# Patient Record
Sex: FEMALE | Race: WHITE | NOT HISPANIC OR LATINO | Employment: FULL TIME | URBAN - METROPOLITAN AREA
[De-identification: names, ages, dates, MRNs, and addresses within clinical notes are randomized per-mention and may not be internally consistent; named-entity substitution may affect disease eponyms.]

---

## 2023-03-06 DIAGNOSIS — I10 HTN (HYPERTENSION), BENIGN: Primary | ICD-10-CM

## 2023-03-06 RX ORDER — TRIAMTERENE/HYDROCHLOROTHIAZID 37.5-25 MG
TABLET ORAL
Qty: 90 TABLET | Refills: 1 | Status: SHIPPED | OUTPATIENT
Start: 2023-03-06 | End: 2023-09-19

## 2023-11-13 NOTE — PROGRESS NOTES
Subjective   Patient ID: Lashon Murguia is a 53 y.o. female who presents for New Patient Visit (Prolapse post hysterectomy, needs mammogram order.).  She is new to the practice. No prior pap results or gyn visit is found for review in the EMR. She is s/p hysterectomy for heavy menses. Left ovary remains.  A few years ago she noted some prolapse and had a consultation but she did not proceed with any treatment. She notes a vaginal bulge, worse with standing. She denies any pain but has some pressure. She had a single episode of incontinence only and denies any stress incontinence. She feels she can empty her bladder fully and does not think she has difficulty with moving her bowels. She desires surgery for prolapse and she is not interested in wearing a pessary.          Review of Systems   Constitutional:  Negative for activity change.   HENT:  Negative for congestion.    Respiratory:  Negative for apnea and cough.    Cardiovascular:  Negative for chest pain.   Gastrointestinal:  Negative for constipation and diarrhea.   Genitourinary:  Negative for hematuria and vaginal pain.   Musculoskeletal:  Negative for joint swelling.   Neurological:  Negative for dizziness.   Psychiatric/Behavioral:  Negative for agitation.        Past Medical History:   Diagnosis Date    Body mass index (BMI) 23.0-23.9, adult 05/18/2021    Body mass index (BMI) of 23.0 to 23.9 in adult    Other idiopathic scoliosis, site unspecified     Scoliosis (and kyphoscoliosis), idiopathic    Personal history of diseases of the blood and blood-forming organs and certain disorders involving the immune mechanism 10/10/2017    History of anemia    Personal history of other medical treatment 10/10/2017    History of blood transfusion      Past Surgical History:   Procedure Laterality Date    BREAST LUMPECTOMY      HYSTERECTOMY  09/19/2016    Hysterectomy    MOLE REMOVAL      TONSILLECTOMY  09/19/2016    Tonsillectomy    TUBAL LIGATION  09/19/2016    Tubal  Ligation    WISDOM TOOTH EXTRACTION        No Known Allergies   Current Outpatient Medications on File Prior to Visit   Medication Sig Dispense Refill    omeprazole (PriLOSEC) 20 mg DR capsule Take 1 capsule (20 mg) by mouth once daily. 90 capsule 3    triamterene-hydrochlorothiazid (Maxzide-25) 37.5-25 mg tablet Take 1 tablet by mouth once daily. 90 tablet 3    [DISCONTINUED] omeprazole (PriLOSEC) 20 mg DR capsule take 1 capsule by mouth once daily 90 capsule 0    [DISCONTINUED] triamterene-hydrochlorothiazid (Maxzide-25) 37.5-25 mg tablet take 1 tablet by mouth once daily 90 tablet 0     No current facility-administered medications on file prior to visit.        Objective   Physical Exam  Constitutional:       Appearance: Normal appearance.   Neck:      Thyroid: No thyromegaly.   Cardiovascular:      Rate and Rhythm: Normal rate and regular rhythm.      Heart sounds: Normal heart sounds.   Pulmonary:      Effort: Pulmonary effort is normal.      Breath sounds: Normal breath sounds.   Chest:      Chest wall: No mass.   Breasts:     Right: Normal. No inverted nipple, mass, nipple discharge or skin change.      Left: Normal. No inverted nipple, mass, nipple discharge or skin change.   Abdominal:      General: There is no distension.      Palpations: Abdomen is soft. There is no mass.      Tenderness: There is no abdominal tenderness.   Genitourinary:     General: Normal vulva.      Exam position: Lithotomy position.      Labia:         Right: No rash.         Left: No rash.       Vagina: Prolapsed vaginal walls present. No lesions.      Uterus: Absent.       Adnexa: Right adnexa normal and left adnexa normal.        Right: No mass or tenderness.          Left: No mass or tenderness.        Comments: Moderate cystocele and vaginal vault prolapse is noted.   Musculoskeletal:         General: No deformity.      Cervical back: Neck supple.   Lymphadenopathy:      Cervical: No cervical adenopathy.   Skin:     General:  Skin is warm and dry.      Findings: No rash.   Neurological:      General: No focal deficit present.      Mental Status: She is alert.   Psychiatric:         Mood and Affect: Mood normal.         Behavior: Behavior is cooperative.         Thought Content: Thought content normal.           Problem List Items Addressed This Visit       Well woman exam with routine gynecological exam - Primary    Overview     She is s/p hysterectomy and removal of right ovary. Left Ovary remains.         Breast cancer screening by mammogram    Overview     Yearly mammograms are recommended.         Relevant Orders    BI mammo bilateral screening    Vaginal prolapse    Overview     Vaginal vault prolapse with cystocele. She desires surgical repair.          Current Assessment & Plan     Referral is placed to Dr Zaldivar, urogynecologist.          Relevant Orders    Referral to Urogynecology    Cystocele with prolapse

## 2023-11-14 ENCOUNTER — OFFICE VISIT (OUTPATIENT)
Dept: PRIMARY CARE | Facility: CLINIC | Age: 53
End: 2023-11-14
Payer: COMMERCIAL

## 2023-11-14 VITALS
WEIGHT: 128 LBS | DIASTOLIC BLOOD PRESSURE: 70 MMHG | RESPIRATION RATE: 15 BRPM | BODY MASS INDEX: 21.33 KG/M2 | SYSTOLIC BLOOD PRESSURE: 118 MMHG | OXYGEN SATURATION: 98 % | HEART RATE: 79 BPM | HEIGHT: 65 IN

## 2023-11-14 DIAGNOSIS — Z00.00 ENCOUNTER FOR ANNUAL PHYSICAL EXAM: Primary | ICD-10-CM

## 2023-11-14 DIAGNOSIS — K21.9 GASTROESOPHAGEAL REFLUX DISEASE WITHOUT ESOPHAGITIS: ICD-10-CM

## 2023-11-14 DIAGNOSIS — M79.605 PAIN AND SWELLING OF LEFT LOWER EXTREMITY: ICD-10-CM

## 2023-11-14 DIAGNOSIS — I10 HTN (HYPERTENSION), BENIGN: ICD-10-CM

## 2023-11-14 DIAGNOSIS — M79.89 PAIN AND SWELLING OF LEFT LOWER EXTREMITY: ICD-10-CM

## 2023-11-14 PROCEDURE — 3078F DIAST BP <80 MM HG: CPT | Performed by: FAMILY MEDICINE

## 2023-11-14 PROCEDURE — 1036F TOBACCO NON-USER: CPT | Performed by: FAMILY MEDICINE

## 2023-11-14 PROCEDURE — 99396 PREV VISIT EST AGE 40-64: CPT | Performed by: FAMILY MEDICINE

## 2023-11-14 PROCEDURE — 3074F SYST BP LT 130 MM HG: CPT | Performed by: FAMILY MEDICINE

## 2023-11-14 RX ORDER — OMEPRAZOLE 20 MG/1
20 CAPSULE, DELAYED RELEASE ORAL DAILY
Qty: 90 CAPSULE | Refills: 3 | Status: SHIPPED | OUTPATIENT
Start: 2023-11-14 | End: 2024-11-13

## 2023-11-14 RX ORDER — TRIAMTERENE/HYDROCHLOROTHIAZID 37.5-25 MG
1 TABLET ORAL DAILY
Qty: 90 TABLET | Refills: 3 | Status: SHIPPED | OUTPATIENT
Start: 2023-11-14 | End: 2024-11-13

## 2023-11-14 ASSESSMENT — ENCOUNTER SYMPTOMS
OCCASIONAL FEELINGS OF UNSTEADINESS: 0
DEPRESSION: 0
LOSS OF SENSATION IN FEET: 0

## 2023-11-14 ASSESSMENT — ANXIETY QUESTIONNAIRES
7. FEELING AFRAID AS IF SOMETHING AWFUL MIGHT HAPPEN: NOT AT ALL
6. BECOMING EASILY ANNOYED OR IRRITABLE: NOT AT ALL
3. WORRYING TOO MUCH ABOUT DIFFERENT THINGS: SEVERAL DAYS
4. TROUBLE RELAXING: NOT AT ALL
IF YOU CHECKED OFF ANY PROBLEMS ON THIS QUESTIONNAIRE, HOW DIFFICULT HAVE THESE PROBLEMS MADE IT FOR YOU TO DO YOUR WORK, TAKE CARE OF THINGS AT HOME, OR GET ALONG WITH OTHER PEOPLE: NOT DIFFICULT AT ALL
1. FEELING NERVOUS, ANXIOUS, OR ON EDGE: SEVERAL DAYS
GAD7 TOTAL SCORE: 3
2. NOT BEING ABLE TO STOP OR CONTROL WORRYING: SEVERAL DAYS
5. BEING SO RESTLESS THAT IT IS HARD TO SIT STILL: NOT AT ALL

## 2023-11-14 ASSESSMENT — PATIENT HEALTH QUESTIONNAIRE - PHQ9
1. LITTLE INTEREST OR PLEASURE IN DOING THINGS: NOT AT ALL
SUM OF ALL RESPONSES TO PHQ9 QUESTIONS 1 AND 2: 0
2. FEELING DOWN, DEPRESSED OR HOPELESS: NOT AT ALL

## 2023-11-14 ASSESSMENT — COLUMBIA-SUICIDE SEVERITY RATING SCALE - C-SSRS
2. HAVE YOU ACTUALLY HAD ANY THOUGHTS OF KILLING YOURSELF?: NO
1. IN THE PAST MONTH, HAVE YOU WISHED YOU WERE DEAD OR WISHED YOU COULD GO TO SLEEP AND NOT WAKE UP?: NO
6. HAVE YOU EVER DONE ANYTHING, STARTED TO DO ANYTHING, OR PREPARED TO DO ANYTHING TO END YOUR LIFE?: NO

## 2023-11-14 NOTE — PROGRESS NOTES
Subjective   Lashon Murguia is a 53 y.o. female and is here for a comprehensive physical exam. The patient reports problems - Had abnormal exam at Gyn  and has follow up with Tasneem Jones. She was told : Prolapsed vaginal walls (cystocele at introitus w/ strain ) present. May need referral to Dr. Zaldivar.   Last physical:   Changes no  Concerns no  Dentist yes  Vision yes  Hearing Problems no  Diet yes  Exercise yes Active at work  Alcohol no  Drugs no  Social History     Tobacco Use   Smoking Status Never   Smokeless Tobacco Never          Do you take any herbs or supplements that were not prescribed by a doctor? no  Are you taking calcium supplements? no  Are you taking aspirin daily? no      History:  LMP: No LMP recorded.  Menopause at hysterectomy   Last pap date: hysterectomy  Abnormal pap? no  : 3  Para: 3  32, 22, 21  Do you have pain that bothers you in your daily life? no  Review of Systems   All other systems reviewed and are negative.       Objective   Physical Exam  Vitals reviewed.   Constitutional:       Appearance: Normal appearance.   HENT:      Head: Normocephalic and atraumatic.      Right Ear: Tympanic membrane normal.      Left Ear: Tympanic membrane normal.      Nose: Nose normal.      Mouth/Throat:      Mouth: Mucous membranes are moist.      Pharynx: Oropharynx is clear.   Eyes:      Extraocular Movements: Extraocular movements intact.      Conjunctiva/sclera: Conjunctivae normal.      Pupils: Pupils are equal, round, and reactive to light.   Cardiovascular:      Rate and Rhythm: Normal rate and regular rhythm.      Pulses: Normal pulses.      Heart sounds: Normal heart sounds.   Pulmonary:      Effort: Pulmonary effort is normal.      Breath sounds: Normal breath sounds.   Abdominal:      General: Abdomen is flat. Bowel sounds are normal.      Palpations: Abdomen is soft.   Musculoskeletal:         General: Normal range of motion.      Cervical back: Normal range of motion and neck  supple.   Skin:     General: Skin is warm and dry.      Capillary Refill: Capillary refill takes less than 2 seconds.   Neurological:      General: No focal deficit present.      Mental Status: She is alert and oriented to person, place, and time.   Psychiatric:         Mood and Affect: Mood normal.         Behavior: Behavior normal.         Assessment/Plan   Healthy female exam. 53  year old female for annual wellness exam.    Recent weight loss- she is a bit stressed and lost a bit more weight recently.   1. 53 year old female- vaginal prolapse.   2. Patient Counseling:  --Nutrition: Stressed importance of moderation in sodium/caffeine intake, saturated fat and cholesterol, caloric balance, sufficient intake of fresh fruits, vegetables, fiber, calcium, iron, and 1 mg of folate supplement per day (for females capable of pregnancy).  --Discussed the issue of estrogen replacement, calcium supplement, and the daily use of baby aspirin.  --Exercise: Stressed the importance of regular exercise.   --Substance Abuse: Discussed cessation/primary prevention of tobacco, alcohol, or other drug use; driving or other dangerous activities under the influence; availability of treatment for abuse.    --Sexuality: Discussed sexually transmitted diseases, partner selection, use of condoms, avoidance of unintended pregnancy  and contraceptive alternatives.   --Injury prevention: Discussed safety belts, safety helmets, smoke detector, smoking near bedding or upholstery.   --Dental health: Discussed importance of regular tooth brushing, flossing, and dental visits.  --Immunizations reviewed.  --Discussed benefits of screening colonoscopy.  --After hours service discussed with patient  3. Discussed the patient's BMI with her.  The BMI is in the acceptable range.  4. Follow up in one year    Follow up if panic attacks increase in frequency.   Labwork ordered. Cologuard test and mammogram available  to you if you would like,.

## 2023-11-16 ENCOUNTER — OFFICE VISIT (OUTPATIENT)
Dept: OBSTETRICS AND GYNECOLOGY | Facility: CLINIC | Age: 53
End: 2023-11-16
Payer: COMMERCIAL

## 2023-11-16 VITALS
BODY MASS INDEX: 20.99 KG/M2 | HEIGHT: 65 IN | SYSTOLIC BLOOD PRESSURE: 110 MMHG | DIASTOLIC BLOOD PRESSURE: 70 MMHG | WEIGHT: 126 LBS

## 2023-11-16 DIAGNOSIS — Z12.31 BREAST CANCER SCREENING BY MAMMOGRAM: ICD-10-CM

## 2023-11-16 DIAGNOSIS — N81.10 VAGINAL PROLAPSE: ICD-10-CM

## 2023-11-16 DIAGNOSIS — N81.4 CYSTOCELE WITH PROLAPSE: ICD-10-CM

## 2023-11-16 DIAGNOSIS — Z01.419 WELL WOMAN EXAM WITH ROUTINE GYNECOLOGICAL EXAM: Primary | ICD-10-CM

## 2023-11-16 PROCEDURE — 1036F TOBACCO NON-USER: CPT | Performed by: OBSTETRICS & GYNECOLOGY

## 2023-11-16 PROCEDURE — 3074F SYST BP LT 130 MM HG: CPT | Performed by: OBSTETRICS & GYNECOLOGY

## 2023-11-16 PROCEDURE — 3078F DIAST BP <80 MM HG: CPT | Performed by: OBSTETRICS & GYNECOLOGY

## 2023-11-16 PROCEDURE — 99386 PREV VISIT NEW AGE 40-64: CPT | Performed by: OBSTETRICS & GYNECOLOGY

## 2023-11-16 ASSESSMENT — ENCOUNTER SYMPTOMS
CONSTIPATION: 0
HEMATURIA: 0
JOINT SWELLING: 0
AGITATION: 0
ACTIVITY CHANGE: 0
COUGH: 0
APNEA: 0
DIARRHEA: 0
DIZZINESS: 0

## 2023-11-24 ENCOUNTER — LAB (OUTPATIENT)
Dept: LAB | Facility: LAB | Age: 53
End: 2023-11-24
Payer: COMMERCIAL

## 2023-11-24 DIAGNOSIS — Z00.00 ENCOUNTER FOR ANNUAL PHYSICAL EXAM: ICD-10-CM

## 2023-11-24 LAB
ALBUMIN SERPL BCP-MCNC: 3.7 G/DL (ref 3.4–5)
ALP SERPL-CCNC: 59 U/L (ref 33–110)
ALT SERPL W P-5'-P-CCNC: 6 U/L (ref 7–45)
ANION GAP SERPL CALC-SCNC: 9 MMOL/L (ref 10–20)
AST SERPL W P-5'-P-CCNC: 9 U/L (ref 9–39)
BASOPHILS # BLD AUTO: 0.04 X10*3/UL (ref 0–0.1)
BASOPHILS NFR BLD AUTO: 0.6 %
BILIRUB SERPL-MCNC: 0.3 MG/DL (ref 0–1.2)
BUN SERPL-MCNC: 22 MG/DL (ref 6–23)
CALCIUM SERPL-MCNC: 8.5 MG/DL (ref 8.6–10.3)
CHLORIDE SERPL-SCNC: 106 MMOL/L (ref 98–107)
CHOLEST SERPL-MCNC: 145 MG/DL (ref 0–199)
CHOLESTEROL/HDL RATIO: 3.1
CO2 SERPL-SCNC: 28 MMOL/L (ref 21–32)
CREAT SERPL-MCNC: 0.66 MG/DL (ref 0.5–1.05)
EOSINOPHIL # BLD AUTO: 0.21 X10*3/UL (ref 0–0.7)
EOSINOPHIL NFR BLD AUTO: 3.1 %
ERYTHROCYTE [DISTWIDTH] IN BLOOD BY AUTOMATED COUNT: 13 % (ref 11.5–14.5)
GFR SERPL CREATININE-BSD FRML MDRD: >90 ML/MIN/1.73M*2
GLUCOSE SERPL-MCNC: 94 MG/DL (ref 74–99)
HCT VFR BLD AUTO: 35.3 % (ref 36–46)
HDLC SERPL-MCNC: 47.4 MG/DL
HGB BLD-MCNC: 11.5 G/DL (ref 12–16)
IMM GRANULOCYTES # BLD AUTO: 0.02 X10*3/UL (ref 0–0.7)
IMM GRANULOCYTES NFR BLD AUTO: 0.3 % (ref 0–0.9)
LDLC SERPL CALC-MCNC: 85 MG/DL
LYMPHOCYTES # BLD AUTO: 2.78 X10*3/UL (ref 1.2–4.8)
LYMPHOCYTES NFR BLD AUTO: 40.4 %
MCH RBC QN AUTO: 29.8 PG (ref 26–34)
MCHC RBC AUTO-ENTMCNC: 32.6 G/DL (ref 32–36)
MCV RBC AUTO: 92 FL (ref 80–100)
MONOCYTES # BLD AUTO: 0.47 X10*3/UL (ref 0.1–1)
MONOCYTES NFR BLD AUTO: 6.8 %
NEUTROPHILS # BLD AUTO: 3.36 X10*3/UL (ref 1.2–7.7)
NEUTROPHILS NFR BLD AUTO: 48.8 %
NON HDL CHOLESTEROL: 98 MG/DL (ref 0–149)
NRBC BLD-RTO: 0 /100 WBCS (ref 0–0)
PLATELET # BLD AUTO: 294 X10*3/UL (ref 150–450)
POTASSIUM SERPL-SCNC: 3.4 MMOL/L (ref 3.5–5.3)
PROT SERPL-MCNC: 6.1 G/DL (ref 6.4–8.2)
RBC # BLD AUTO: 3.86 X10*6/UL (ref 4–5.2)
SODIUM SERPL-SCNC: 140 MMOL/L (ref 136–145)
TRIGL SERPL-MCNC: 64 MG/DL (ref 0–149)
TSH SERPL-ACNC: 1.97 MIU/L (ref 0.44–3.98)
VLDL: 13 MG/DL (ref 0–40)
WBC # BLD AUTO: 6.9 X10*3/UL (ref 4.4–11.3)

## 2023-11-24 PROCEDURE — 36415 COLL VENOUS BLD VENIPUNCTURE: CPT

## 2023-11-24 PROCEDURE — 84443 ASSAY THYROID STIM HORMONE: CPT

## 2023-11-24 PROCEDURE — 80061 LIPID PANEL: CPT

## 2023-11-24 PROCEDURE — 85025 COMPLETE CBC W/AUTO DIFF WBC: CPT

## 2023-11-24 PROCEDURE — 80053 COMPREHEN METABOLIC PANEL: CPT

## 2023-12-04 ENCOUNTER — TELEPHONE (OUTPATIENT)
Dept: PRIMARY CARE | Facility: CLINIC | Age: 53
End: 2023-12-04
Payer: COMMERCIAL

## 2023-12-04 DIAGNOSIS — Z12.11 COLON CANCER SCREENING: ICD-10-CM

## 2023-12-04 DIAGNOSIS — E87.6 CHRONIC HYPOKALEMIA: Primary | ICD-10-CM

## 2023-12-04 NOTE — TELEPHONE ENCOUNTER
----- Message from Kyleigh Kwan MD sent at 12/4/2023  8:50 AM EST -----  Labs results received. A few abnormalities. Your potassium is low- that is most likely from the triamterene. As it is the second year in a row, I would consider a daily potassium supplement. May I send for you? Also you were a bit anemia, The most common cause of blood loss in the post menopausal woman is the gut, unless you have donated blood recently. Would recommend GI screening such as a colonoscopy or a cologuard test.Let me know if you would like to recheck in six months or order one of these.

## 2023-12-04 NOTE — RESULT ENCOUNTER NOTE
Labs results received. A few abnormalities. Your potassium is low- that is most likely from the triamterene. As it is the second year in a row, I would consider a daily potassium supplement. May I send for you? Also you were a bit anemia, The most common cause of blood loss in the post menopausal woman is the gut, unless you have donated blood recently. Would recommend GI screening such as a colonoscopy or a cologuard test.Let me know if you would like to recheck in six months or order one of these.

## 2023-12-05 RX ORDER — POTASSIUM CHLORIDE 20 MEQ/1
20 TABLET, EXTENDED RELEASE ORAL DAILY
Qty: 30 TABLET | Refills: 11 | Status: SHIPPED | OUTPATIENT
Start: 2023-12-05 | End: 2023-12-30 | Stop reason: SINTOL

## 2023-12-18 LAB — NONINV COLON CA DNA+OCC BLD SCRN STL QL: NEGATIVE

## 2023-12-27 ENCOUNTER — TELEPHONE (OUTPATIENT)
Dept: PRIMARY CARE | Facility: CLINIC | Age: 53
End: 2023-12-27
Payer: COMMERCIAL

## 2023-12-27 DIAGNOSIS — E87.6 HYPOKALEMIA: Primary | ICD-10-CM

## 2023-12-27 NOTE — TELEPHONE ENCOUNTER
----- Message from Kyleigh Kwan MD sent at 12/27/2023  1:31 PM EST -----  Cologuard results were received and were reported as within normal limits

## 2023-12-30 RX ORDER — POTASSIUM CHLORIDE 750 MG/1
20 CAPSULE, EXTENDED RELEASE ORAL DAILY
Qty: 60 CAPSULE | Refills: 11 | Status: SHIPPED | OUTPATIENT
Start: 2023-12-30 | End: 2024-12-29

## 2024-01-09 ENCOUNTER — OFFICE VISIT (OUTPATIENT)
Dept: UROLOGY | Facility: CLINIC | Age: 54
End: 2024-01-09
Payer: COMMERCIAL

## 2024-01-09 VITALS — DIASTOLIC BLOOD PRESSURE: 84 MMHG | SYSTOLIC BLOOD PRESSURE: 136 MMHG

## 2024-01-09 DIAGNOSIS — N81.10 VAGINAL PROLAPSE: Primary | ICD-10-CM

## 2024-01-09 LAB
POC BILIRUBIN, URINE: NEGATIVE
POC BLOOD, URINE: NEGATIVE
POC GLUCOSE, URINE: NEGATIVE MG/DL
POC KETONES, URINE: ABNORMAL MG/DL
POC LEUKOCYTES, URINE: NEGATIVE
POC NITRITE,URINE: NEGATIVE
POC PH, URINE: 6 PH
POC PROTEIN, URINE: ABNORMAL MG/DL
POC SPECIFIC GRAVITY, URINE: 1.01
POC UROBILINOGEN, URINE: 0.2 EU/DL

## 2024-01-09 PROCEDURE — 51798 US URINE CAPACITY MEASURE: CPT | Performed by: STUDENT IN AN ORGANIZED HEALTH CARE EDUCATION/TRAINING PROGRAM

## 2024-01-09 PROCEDURE — 81003 URINALYSIS AUTO W/O SCOPE: CPT | Performed by: STUDENT IN AN ORGANIZED HEALTH CARE EDUCATION/TRAINING PROGRAM

## 2024-01-09 PROCEDURE — 3079F DIAST BP 80-89 MM HG: CPT | Performed by: STUDENT IN AN ORGANIZED HEALTH CARE EDUCATION/TRAINING PROGRAM

## 2024-01-09 PROCEDURE — 1036F TOBACCO NON-USER: CPT | Performed by: STUDENT IN AN ORGANIZED HEALTH CARE EDUCATION/TRAINING PROGRAM

## 2024-01-09 PROCEDURE — 99205 OFFICE O/P NEW HI 60 MIN: CPT | Performed by: STUDENT IN AN ORGANIZED HEALTH CARE EDUCATION/TRAINING PROGRAM

## 2024-01-09 PROCEDURE — 3075F SYST BP GE 130 - 139MM HG: CPT | Performed by: STUDENT IN AN ORGANIZED HEALTH CARE EDUCATION/TRAINING PROGRAM

## 2024-01-09 RX ORDER — GABAPENTIN 300 MG/1
600 CAPSULE ORAL ONCE
Status: CANCELLED | OUTPATIENT
Start: 2024-01-09 | End: 2024-01-09

## 2024-01-09 RX ORDER — CELECOXIB 50 MG/1
400 CAPSULE ORAL ONCE
Status: CANCELLED | OUTPATIENT
Start: 2024-01-09 | End: 2024-01-09

## 2024-01-09 RX ORDER — PHENAZOPYRIDINE HYDROCHLORIDE 200 MG/1
200 TABLET, FILM COATED ORAL ONCE
Status: CANCELLED | OUTPATIENT
Start: 2024-01-09 | End: 2024-01-09

## 2024-01-09 RX ORDER — ACETAMINOPHEN 325 MG/1
975 TABLET ORAL ONCE
Status: CANCELLED | OUTPATIENT
Start: 2024-01-09 | End: 2024-01-09

## 2024-01-09 NOTE — PROGRESS NOTES
Chief Complaint  New patient - Bladder prolapse    History of Present Illness  The patient is a 53 year old female referred for a prolapsed bladder that is worsening.  and right salpingo-oophorectomy. she underwent TLH about 7 years ago.  She complains of vaginal bulge that has gotten worse she is noticing it more when she is working and at the end of the day.  She does not have significant incontinence but she has had one episode of incontinence several months ago. She stood up out of bed and immediately lost control of her bladder.  Her bowel movements are normal.  She has had no other pelvic surgery.  She has 3 children that were all born vaginally. 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    Review of Systems     Constitutional: No fever,~No chills~and~No fatigue.   Eyes: No vision problems~and~No dryness of the eyes.   ENT: No dry mouth,~No hearing loss~and~No nosebleeds.   Cardiovascular: No chest pain,~No palpitations~and~No orthopnea.   Respiratory: No shortness of breath,~No cough~and~No wheezing.   Gastrointestinal: No abdominal pain,~No constipation,~No nausea,~No diarrhea,~No vomiting~and~No melena.   Genitourinary: As noted in HPI.   Musculoskeletal: No back pain,~No myalgias,~No muscle weakness,~No joint swelling~and~No leg edema.   Integumentary: No rashes,~No skin lesion~and~No itching.   Neurological: No headache,~No numbness~and~No dizziness.   Psychiatric: No sleep disturbances,~No anxiety~and~No depression.   Endocrine: No hot flashes,~No loss of hair~and~No hirsutism.   Hematologic/Lymphatic: No swollen glands,~No tendency for easy bleeding~and~No tendency for easy bruising.   All other systems have been reviewed and are negative for complaint.     Surgical History  Hysterectomy - Left ovary still in place    Past Medical History:   Diagnosis Date    Body mass index (BMI) 23.0-23.9, adult 05/18/2021    Body mass index (BMI) of 23.0 to 23.9 in adult    Other idiopathic  scoliosis, site unspecified     Scoliosis (and kyphoscoliosis), idiopathic    Personal history of diseases of the blood and blood-forming organs and certain disorders involving the immune mechanism 10/10/2017    History of anemia    Personal history of other medical treatment 10/10/2017    History of blood transfusion        Current Outpatient Medications:     omeprazole (PriLOSEC) 20 mg DR capsule, Take 1 capsule (20 mg) by mouth once daily., Disp: 90 capsule, Rfl: 3    potassium chloride ER (Micro-K) 10 mEq ER capsule, Take 2 capsules (20 mEq) by mouth once daily. Do not crush or chew., Disp: 60 capsule, Rfl: 11    triamterene-hydrochlorothiazid (Maxzide-25) 37.5-25 mg tablet, Take 1 tablet by mouth once daily., Disp: 90 tablet, Rfl: 3       Assessment/Plan  52 yo with stage 2 Vaginal Prolapse    POP  I discussed treatment options including pessary and surgery, with regard to surgery  discussed SCP vs native tissue repair; for SCP the failure rate is 5-10%, but associated with mesh complications including erosion <1% and SBO <0.5% vs native tissue repair which is associated with 20-30% failure rate, but no long term risk of complications and only~15% requiring additional treatment.    Plan on lap scp,     F/up after UDS       Scribe Attestation  By signing my name below, I, Louie Mendes   attest that this documentation has been prepared under the direction and in the presence of Jeff Zaldivar MD.

## 2024-01-09 NOTE — LETTER
January 9, 2024     Ariana Mike MD  [address]  [address] [place] [id number]    Patient: Lashon Murguia   YOB: 1970   Date of Visit: 1/9/2024       Dear Dr. Ariana Mike MD:    Thank you for referring Lashon Murguia to me for evaluation. Below are my notes for this consultation.  If you have questions, please do not hesitate to call me. I look forward to following your patient along with you.       Sincerely,     Jeff Zaldivar MD      CC: Kyleigh Kwan MD  ______________________________________________________________________________________    Chief Complaint  New patient - Bladder prolapse    History of Present Illness  The patient is a 53 year old female referred for a prolapsed bladder that is worsening. Her left ovary is still in place after her hysterectomy about 6 or 7 years ago. She has had one episode of incontinence several months ago. She stood up out of bed and immediately lost control of her bladder. She has been trying to ignore the problem but it is becoming to much.  She has 3 children that were all born vaginally. 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    Review of Systems     Constitutional: No fever,~No chills~and~No fatigue.   Eyes: No vision problems~and~No dryness of the eyes.   ENT: No dry mouth,~No hearing loss~and~No nosebleeds.   Cardiovascular: No chest pain,~No palpitations~and~No orthopnea.   Respiratory: No shortness of breath,~No cough~and~No wheezing.   Gastrointestinal: No abdominal pain,~No constipation,~No nausea,~No diarrhea,~No vomiting~and~No melena.   Genitourinary: As noted in HPI.   Musculoskeletal: No back pain,~No myalgias,~No muscle weakness,~No joint swelling~and~No leg edema.   Integumentary: No rashes,~No skin lesion~and~No itching.   Neurological: No headache,~No numbness~and~No dizziness.   Psychiatric: No sleep disturbances,~No anxiety~and~No depression.   Endocrine: No hot flashes,~No loss of hair~and~No  hirsutism.   Hematologic/Lymphatic: No swollen glands,~No tendency for easy bleeding~and~No tendency for easy bruising.   All other systems have been reviewed and are negative for complaint.     Surgical History  Hysterectomy - Left ovary still in place    Past Medical History:   Diagnosis Date   • Body mass index (BMI) 23.0-23.9, adult 05/18/2021    Body mass index (BMI) of 23.0 to 23.9 in adult   • Other idiopathic scoliosis, site unspecified     Scoliosis (and kyphoscoliosis), idiopathic   • Personal history of diseases of the blood and blood-forming organs and certain disorders involving the immune mechanism 10/10/2017    History of anemia   • Personal history of other medical treatment 10/10/2017    History of blood transfusion        Current Outpatient Medications:   •  omeprazole (PriLOSEC) 20 mg DR capsule, Take 1 capsule (20 mg) by mouth once daily., Disp: 90 capsule, Rfl: 3  •  potassium chloride ER (Micro-K) 10 mEq ER capsule, Take 2 capsules (20 mEq) by mouth once daily. Do not crush or chew., Disp: 60 capsule, Rfl: 11  •  triamterene-hydrochlorothiazid (Maxzide-25) 37.5-25 mg tablet, Take 1 tablet by mouth once daily., Disp: 90 tablet, Rfl: 3       Assessment/Plan  54 yo with stage 2 Vaginal Prolapse    POP  I discussed treatment options including pessary and surgery, with regard to surgery  discussed SCP vs native tissue repair; for SCP the failure rate is 5-10%, but associated with mesh complications including erosion <1% and SBO <0.5% vs native tissue repair which is associated with 20-30% failure rate, but no long term risk of complications and only~15% requiring additional treatment.    Plan on lap scp,     F/up after UDS       Scribe Attestation  By signing my name below, I, Louie Mendes   attest that this documentation has been prepared under the direction and in the presence of Jeff Zaldivar MD.

## 2024-02-19 NOTE — PROGRESS NOTES
"CHIEF COMPLAINT: FUV         HISTORY OF PRESENT ILLNESS:  Lashon Murguia is a 54 y/o female presenting on 2/20/24 for a follow up. Patient reports issues with bowel movements. When she wakes up she experiences fecal urge but no incontinence. UDS demonstrates normal emptying no GEORGE, will not need sling          Past Medical History  She has a past medical history of Body mass index (BMI) 23.0-23.9, adult (05/18/2021), Other idiopathic scoliosis, site unspecified, Personal history of diseases of the blood and blood-forming organs and certain disorders involving the immune mechanism (10/10/2017), and Personal history of other medical treatment (10/10/2017).    Surgical History  She has a past surgical history that includes Tubal ligation (09/19/2016); Hysterectomy (09/19/2016); Tonsillectomy (09/19/2016); [place] tooth extraction; Breast lumpectomy; and Mole removal.     Social History  She reports that she has never smoked. She has never used smokeless tobacco. She reports that she does not currently use alcohol. She reports that she does not use drugs.    Family History  No family history on file.     Allergies  Patient has no known allergies.      A comprehensive 10+ review of systems was negative except for: see hpi                          PHYSICAL EXAMINATION:  BP Readings from Last 3 Encounters:   01/09/24 136/84   11/16/23 110/70   11/14/23 118/70      Wt Readings from Last 3 Encounters:   11/16/23 57.2 kg (126 lb)   11/14/23 58.1 kg (128 lb)   08/26/22 64.9 kg (143 lb)      BMI:   Estimated body mass index is 20.97 kg/m² as calculated from the following:    Height as of 11/16/23: 1.651 m (5' 5\").    Weight as of 11/16/23: 57.2 kg (126 lb).  BSA:   Estimated body surface area is 1.62 meters squared as calculated from the following:    Height as of 11/16/23: 1.651 m (5' 5\").    Weight as of 11/16/23: 57.2 kg (126 lb).  HEENT: Normocephalic, atraumatic, PER EOMI, nonicteric, trachea normal, thyroid normal, " oropharynx normal.  CARDIAC: regular rate & rhythm, S1 & S2 normal.  No heaves, thrills, gallops or murmurs.  LUNGS: Clear to auscultation, no spinal or CV tenderness.  EXTREMITIES: No evidence of cyanosis, clubbing or edema.           Provider Impressions:  52 yo with stage 2 Vaginal Prolapse     POP  Proceed with lap scp on 5/8/24   UDS negative, will not need sling     Follow up virtually 2 weeks pre-op      Jeff Zaldivar MD    By signing my name below, IDeandre Scribe   attest that this documentation has been prepared under the direction and in the presence of Dr. Jeff Zaldivar.

## 2024-02-20 ENCOUNTER — OFFICE VISIT (OUTPATIENT)
Dept: UROLOGY | Facility: CLINIC | Age: 54
End: 2024-02-20
Payer: COMMERCIAL

## 2024-02-20 ENCOUNTER — PROCEDURE VISIT (OUTPATIENT)
Dept: UROLOGY | Facility: CLINIC | Age: 54
End: 2024-02-20
Payer: COMMERCIAL

## 2024-02-20 DIAGNOSIS — N81.10 VAGINAL PROLAPSE: ICD-10-CM

## 2024-02-20 DIAGNOSIS — N81.9 FEMALE GENITAL PROLAPSE, UNSPECIFIED TYPE: Primary | ICD-10-CM

## 2024-02-20 PROCEDURE — 51741 ELECTRO-UROFLOWMETRY FIRST: CPT | Performed by: STUDENT IN AN ORGANIZED HEALTH CARE EDUCATION/TRAINING PROGRAM

## 2024-02-20 PROCEDURE — 99213 OFFICE O/P EST LOW 20 MIN: CPT | Performed by: STUDENT IN AN ORGANIZED HEALTH CARE EDUCATION/TRAINING PROGRAM

## 2024-02-20 PROCEDURE — 51729 CYSTOMETROGRAM W/VP&UP: CPT | Performed by: STUDENT IN AN ORGANIZED HEALTH CARE EDUCATION/TRAINING PROGRAM

## 2024-02-20 PROCEDURE — 1036F TOBACCO NON-USER: CPT | Performed by: STUDENT IN AN ORGANIZED HEALTH CARE EDUCATION/TRAINING PROGRAM

## 2024-02-20 PROCEDURE — 51784 ANAL/URINARY MUSCLE STUDY: CPT | Performed by: STUDENT IN AN ORGANIZED HEALTH CARE EDUCATION/TRAINING PROGRAM

## 2024-02-20 PROCEDURE — 51797 INTRAABDOMINAL PRESSURE TEST: CPT | Performed by: STUDENT IN AN ORGANIZED HEALTH CARE EDUCATION/TRAINING PROGRAM

## 2024-02-20 NOTE — PROGRESS NOTES
Urodynamic Study:  Lashon Murguia identified using 2 forms of identification. A timeout was completed, patient is in the correct position and all safety precautions have been taken.   Risks, Benefits and Alternatives:  Risks, benefits and alternatives were discussed in detail. The patient appears to understand and agrees to proceed. Lashon Murguia has completed, signed and dated the procedure consent form.    Uroflow completed.  Using sterile technique, a 7fr Air Charge Catheter was inserted into the bladder. Rectal catheter inserted approximately 15cm  Bladder filled with normal saline at a rate of 49.9ml/min 548.6  ml of normal saline instilled in bladder prior to voiding.   Results:  Post void residual  (PVR)  volume of  150 ml.      Patient here for urodynamic study. Discussed procedure. Bladder diary reviewed. Patient complains of prolapse. She feels a bulge but denies any incontinence/leaks. Patient did have a BM with uroflow. Performed UDS without difficulty. Instructed patient to increase fluid intake.

## 2024-04-23 ENCOUNTER — TELEMEDICINE (OUTPATIENT)
Dept: UROLOGY | Facility: CLINIC | Age: 54
End: 2024-04-23
Payer: COMMERCIAL

## 2024-04-23 DIAGNOSIS — N81.9 FEMALE GENITAL PROLAPSE, UNSPECIFIED TYPE: Primary | ICD-10-CM

## 2024-04-23 PROCEDURE — 99442 PR PHYS/QHP TELEPHONE EVALUATION 11-20 MIN: CPT | Performed by: STUDENT IN AN ORGANIZED HEALTH CARE EDUCATION/TRAINING PROGRAM

## 2024-04-23 RX ORDER — TAMSULOSIN HYDROCHLORIDE 0.4 MG/1
CAPSULE ORAL
Qty: 10 CAPSULE | Refills: 0 | Status: SHIPPED | OUTPATIENT
Start: 2024-04-23

## 2024-04-23 NOTE — PROGRESS NOTES
"HISTORY OF PRESENT ILLNESS:  Lashon Murguia is a 54 y.o. female who presents today for a pre-surgical virtual follow up visit. She confirms that she will be out of work for about 6 weeks after the surgery. She has no other questions at this time.          Past Medical History  She has a past medical history of Body mass index (BMI) 23.0-23.9, adult (05/18/2021), Other idiopathic scoliosis, site unspecified, Personal history of diseases of the blood and blood-forming organs and certain disorders involving the immune mechanism (10/10/2017), and Personal history of other medical treatment (10/10/2017).    Surgical History  She has a past surgical history that includes Tubal ligation (09/19/2016); Hysterectomy (09/19/2016); Tonsillectomy (09/19/2016); [place] tooth extraction; Breast lumpectomy; and Mole removal.     Social History  She reports that she has never smoked. She has never used smokeless tobacco. She reports that she does not currently use alcohol. She reports that she does not use drugs.    Family History  No family history on file.     Allergies  Patient has no known allergies.      A comprehensive 10+ review of systems was negative except for: see hpi                          PHYSICAL EXAMINATION:  BP Readings from Last 3 Encounters:   01/09/24 136/84   11/16/23 110/70   11/14/23 118/70      Wt Readings from Last 3 Encounters:   11/16/23 57.2 kg (126 lb)   11/14/23 58.1 kg (128 lb)   08/26/22 64.9 kg (143 lb)      BMI: Estimated body mass index is 20.97 kg/m² as calculated from the following:    Height as of 11/16/23: 1.651 m (5' 5\").    Weight as of 11/16/23: 57.2 kg (126 lb).  BSA: Estimated body surface area is 1.62 meters squared as calculated from the following:    Height as of 11/16/23: 1.651 m (5' 5\").    Weight as of 11/16/23: 57.2 kg (126 lb).  HEENT: Normocephalic, atraumatic, PER EOMI, nonicteric, trachea normal, thyroid normal, oropharynx normal.  CARDIAC: regular rate & rhythm, S1 & S2 normal.  " No heaves, thrills, gallops or murmurs.  LUNGS: Clear to auscultation, no spinal or CV tenderness.  EXTREMITIES: No evidence of cyanosis, clubbing or edema.               Assessment:  55 yo with stage 2 Vaginal Prolapse     POP  Proceed with lap scp on 5/8/24   UDS negative, will not need sling   Rx flomax, 3 days pre-op and 7 days post-op     Follow up post op      All questions and concerns were answered and addressed.  The patient expressed understanding and agrees with the plan.     Jeff Zaldivar MD    Scribe Attestation  By signing my name below, I, Melina Ortiz, Scribe   attest that this documentation has been prepared under the direction and in the presence of Jeff Zaldivar MD.

## 2024-05-06 ENCOUNTER — ANESTHESIA EVENT (OUTPATIENT)
Dept: OPERATING ROOM | Facility: HOSPITAL | Age: 54
End: 2024-05-06
Payer: COMMERCIAL

## 2024-05-07 RX ORDER — ALBUTEROL SULFATE 0.83 MG/ML
2.5 SOLUTION RESPIRATORY (INHALATION) ONCE AS NEEDED
Status: CANCELLED | OUTPATIENT
Start: 2024-05-07

## 2024-05-07 RX ORDER — ACETAMINOPHEN 325 MG/1
650 TABLET ORAL EVERY 4 HOURS PRN
Status: CANCELLED | OUTPATIENT
Start: 2024-05-07

## 2024-05-07 NOTE — H&P
History Of Present Illness  Lashon Murguia is a 53 yo with stage 2 Vaginal Prolapse presenting for lap scp.       UDS negative, will not need sling      Past Medical History  Past Medical History:   Diagnosis Date    Body mass index (BMI) 23.0-23.9, adult 05/18/2021    Body mass index (BMI) of 23.0 to 23.9 in adult    GERD (gastroesophageal reflux disease)     History of Anemia 10/10/2017    History of anemia    HTN (hypertension)     Idiopathic scoliosis, site unspecified     Scoliosis (and kyphoscoliosis), idiopathic    Personal history of other medical treatment 10/10/2017    History of blood transfusion    Vaginal prolapse        Surgical History  Past Surgical History:   Procedure Laterality Date    BREAST LUMPECTOMY      HYSTERECTOMY  09/19/2016    Hysterectomy    MOLE REMOVAL      TONSILLECTOMY  09/19/2016    Tonsillectomy    TUBAL LIGATION  09/19/2016    Tubal Ligation    WISDOM TOOTH EXTRACTION          Social History  She reports that she has never smoked. She has never used smokeless tobacco. She reports that she does not currently use alcohol. She reports that she does not use drugs.    Family History  No family history on file.     Allergies  Patient has no known allergies.    Review of Systems   All other systems reviewed and are negative.       Physical Exam  HENT:      Mouth/Throat:      Mouth: Mucous membranes are dry.   Eyes:      Pupils: Pupils are equal, round, and reactive to light.   Cardiovascular:      Rate and Rhythm: Normal rate.   Pulmonary:      Effort: Pulmonary effort is normal.   Abdominal:      General: Abdomen is flat.      Palpations: Abdomen is soft.   Musculoskeletal:         General: Normal range of motion.      Cervical back: Normal range of motion.   Skin:     General: Skin is warm and dry.   Neurological:      General: No focal deficit present.      Mental Status: She is alert.   Psychiatric:         Mood and Affect: Mood normal.          Last Recorded Vitals  There were no  vitals taken for this visit.         Assessment/Plan   Principal Problem:    Vaginal prolapse      lap scp           Mary Sommer MD

## 2024-05-08 ENCOUNTER — ANESTHESIA (OUTPATIENT)
Dept: OPERATING ROOM | Facility: HOSPITAL | Age: 54
End: 2024-05-08
Payer: COMMERCIAL

## 2024-05-08 ENCOUNTER — HOSPITAL ENCOUNTER (OUTPATIENT)
Facility: HOSPITAL | Age: 54
Setting detail: OUTPATIENT SURGERY
Discharge: HOME | End: 2024-05-08
Attending: STUDENT IN AN ORGANIZED HEALTH CARE EDUCATION/TRAINING PROGRAM | Admitting: STUDENT IN AN ORGANIZED HEALTH CARE EDUCATION/TRAINING PROGRAM
Payer: COMMERCIAL

## 2024-05-08 VITALS
RESPIRATION RATE: 18 BRPM | SYSTOLIC BLOOD PRESSURE: 110 MMHG | OXYGEN SATURATION: 99 % | DIASTOLIC BLOOD PRESSURE: 62 MMHG | HEART RATE: 63 BPM | BODY MASS INDEX: 21.3 KG/M2 | HEIGHT: 65 IN | WEIGHT: 127.87 LBS | TEMPERATURE: 96.8 F

## 2024-05-08 DIAGNOSIS — N81.4 CYSTOCELE WITH PROLAPSE: ICD-10-CM

## 2024-05-08 DIAGNOSIS — Z12.31 BREAST CANCER SCREENING BY MAMMOGRAM: ICD-10-CM

## 2024-05-08 DIAGNOSIS — N81.10 VAGINAL PROLAPSE: Primary | ICD-10-CM

## 2024-05-08 PROBLEM — J30.2 SEASONAL ALLERGIES: Status: ACTIVE | Noted: 2024-05-08

## 2024-05-08 PROCEDURE — 2720000007 HC OR 272 NO HCPCS: Performed by: STUDENT IN AN ORGANIZED HEALTH CARE EDUCATION/TRAINING PROGRAM

## 2024-05-08 PROCEDURE — 2500000004 HC RX 250 GENERAL PHARMACY W/ HCPCS (ALT 636 FOR OP/ED): Performed by: ANESTHESIOLOGY

## 2024-05-08 PROCEDURE — 2500000001 HC RX 250 WO HCPCS SELF ADMINISTERED DRUGS (ALT 637 FOR MEDICARE OP): Performed by: STUDENT IN AN ORGANIZED HEALTH CARE EDUCATION/TRAINING PROGRAM

## 2024-05-08 PROCEDURE — 57425 LAPAROSCOPY SURG COLPOPEXY: CPT | Performed by: STUDENT IN AN ORGANIZED HEALTH CARE EDUCATION/TRAINING PROGRAM

## 2024-05-08 PROCEDURE — 2780000003 HC OR 278 NO HCPCS: Performed by: STUDENT IN AN ORGANIZED HEALTH CARE EDUCATION/TRAINING PROGRAM

## 2024-05-08 PROCEDURE — 7100000010 HC PHASE TWO TIME - EACH INCREMENTAL 1 MINUTE: Performed by: STUDENT IN AN ORGANIZED HEALTH CARE EDUCATION/TRAINING PROGRAM

## 2024-05-08 PROCEDURE — 58925 REMOVAL OF OVARIAN CYST(S): CPT | Performed by: STUDENT IN AN ORGANIZED HEALTH CARE EDUCATION/TRAINING PROGRAM

## 2024-05-08 PROCEDURE — 2500000005 HC RX 250 GENERAL PHARMACY W/O HCPCS: Performed by: NURSE ANESTHETIST, CERTIFIED REGISTERED

## 2024-05-08 PROCEDURE — 3700000002 HC GENERAL ANESTHESIA TIME - EACH INCREMENTAL 1 MINUTE: Performed by: STUDENT IN AN ORGANIZED HEALTH CARE EDUCATION/TRAINING PROGRAM

## 2024-05-08 PROCEDURE — 7100000002 HC RECOVERY ROOM TIME - EACH INCREMENTAL 1 MINUTE: Performed by: STUDENT IN AN ORGANIZED HEALTH CARE EDUCATION/TRAINING PROGRAM

## 2024-05-08 PROCEDURE — C1781 MESH (IMPLANTABLE): HCPCS | Performed by: STUDENT IN AN ORGANIZED HEALTH CARE EDUCATION/TRAINING PROGRAM

## 2024-05-08 PROCEDURE — 7100000009 HC PHASE TWO TIME - INITIAL BASE CHARGE: Performed by: STUDENT IN AN ORGANIZED HEALTH CARE EDUCATION/TRAINING PROGRAM

## 2024-05-08 PROCEDURE — 3600000004 HC OR TIME - INITIAL BASE CHARGE - PROCEDURE LEVEL FOUR: Performed by: STUDENT IN AN ORGANIZED HEALTH CARE EDUCATION/TRAINING PROGRAM

## 2024-05-08 PROCEDURE — A57425 PR LAPAROSCOPY, SURG, COLPOPEXY: Performed by: NURSE ANESTHETIST, CERTIFIED REGISTERED

## 2024-05-08 PROCEDURE — 7100000001 HC RECOVERY ROOM TIME - INITIAL BASE CHARGE: Performed by: STUDENT IN AN ORGANIZED HEALTH CARE EDUCATION/TRAINING PROGRAM

## 2024-05-08 PROCEDURE — 2500000005 HC RX 250 GENERAL PHARMACY W/O HCPCS: Performed by: STUDENT IN AN ORGANIZED HEALTH CARE EDUCATION/TRAINING PROGRAM

## 2024-05-08 PROCEDURE — A57425 PR LAPAROSCOPY, SURG, COLPOPEXY: Performed by: STUDENT IN AN ORGANIZED HEALTH CARE EDUCATION/TRAINING PROGRAM

## 2024-05-08 PROCEDURE — 88305 TISSUE EXAM BY PATHOLOGIST: CPT | Mod: TC,GEALAB,WESLAB | Performed by: STUDENT IN AN ORGANIZED HEALTH CARE EDUCATION/TRAINING PROGRAM

## 2024-05-08 PROCEDURE — 3600000009 HC OR TIME - EACH INCREMENTAL 1 MINUTE - PROCEDURE LEVEL FOUR: Performed by: STUDENT IN AN ORGANIZED HEALTH CARE EDUCATION/TRAINING PROGRAM

## 2024-05-08 PROCEDURE — 3700000001 HC GENERAL ANESTHESIA TIME - INITIAL BASE CHARGE: Performed by: STUDENT IN AN ORGANIZED HEALTH CARE EDUCATION/TRAINING PROGRAM

## 2024-05-08 PROCEDURE — 2500000004 HC RX 250 GENERAL PHARMACY W/ HCPCS (ALT 636 FOR OP/ED): Performed by: NURSE ANESTHETIST, CERTIFIED REGISTERED

## 2024-05-08 PROCEDURE — 88305 TISSUE EXAM BY PATHOLOGIST: CPT | Performed by: STUDENT IN AN ORGANIZED HEALTH CARE EDUCATION/TRAINING PROGRAM

## 2024-05-08 DEVICE — MESH, Y, VERTESSA LITE 26 X 4 X 3CM: Type: IMPLANTABLE DEVICE | Site: BLADDER | Status: FUNCTIONAL

## 2024-05-08 RX ORDER — TRAMADOL HYDROCHLORIDE 50 MG/1
50 TABLET ORAL EVERY 6 HOURS PRN
Qty: 15 TABLET | Refills: 0 | Status: SHIPPED | OUTPATIENT
Start: 2024-05-08

## 2024-05-08 RX ORDER — PHENAZOPYRIDINE HYDROCHLORIDE 100 MG/1
200 TABLET, FILM COATED ORAL ONCE
Status: COMPLETED | OUTPATIENT
Start: 2024-05-08 | End: 2024-05-08

## 2024-05-08 RX ORDER — POLYETHYLENE GLYCOL 3350 17 G/17G
17 POWDER, FOR SOLUTION ORAL DAILY
Qty: 510 G | Refills: 0 | Status: SHIPPED | OUTPATIENT
Start: 2024-05-08 | End: 2024-06-07

## 2024-05-08 RX ORDER — ROCURONIUM BROMIDE 10 MG/ML
INJECTION, SOLUTION INTRAVENOUS AS NEEDED
Status: DISCONTINUED | OUTPATIENT
Start: 2024-05-08 | End: 2024-05-08

## 2024-05-08 RX ORDER — FENTANYL CITRATE 50 UG/ML
INJECTION, SOLUTION INTRAMUSCULAR; INTRAVENOUS AS NEEDED
Status: DISCONTINUED | OUTPATIENT
Start: 2024-05-08 | End: 2024-05-08

## 2024-05-08 RX ORDER — SODIUM CHLORIDE, SODIUM LACTATE, POTASSIUM CHLORIDE, CALCIUM CHLORIDE 600; 310; 30; 20 MG/100ML; MG/100ML; MG/100ML; MG/100ML
100 INJECTION, SOLUTION INTRAVENOUS CONTINUOUS
Status: DISCONTINUED | OUTPATIENT
Start: 2024-05-08 | End: 2024-05-08 | Stop reason: HOSPADM

## 2024-05-08 RX ORDER — GABAPENTIN 300 MG/1
600 CAPSULE ORAL ONCE
Status: COMPLETED | OUTPATIENT
Start: 2024-05-08 | End: 2024-05-08

## 2024-05-08 RX ORDER — DOCUSATE SODIUM 100 MG/1
100 CAPSULE, LIQUID FILLED ORAL 2 TIMES DAILY
Qty: 60 CAPSULE | Refills: 0 | Status: SHIPPED | OUTPATIENT
Start: 2024-05-08 | End: 2024-06-07

## 2024-05-08 RX ORDER — ONDANSETRON HYDROCHLORIDE 2 MG/ML
8 INJECTION, SOLUTION INTRAVENOUS ONCE
Status: DISCONTINUED | OUTPATIENT
Start: 2024-05-08 | End: 2024-05-08 | Stop reason: HOSPADM

## 2024-05-08 RX ORDER — CELECOXIB 400 MG/1
400 CAPSULE ORAL ONCE
Status: COMPLETED | OUTPATIENT
Start: 2024-05-08 | End: 2024-05-08

## 2024-05-08 RX ORDER — ACETAMINOPHEN 500 MG
1000 TABLET ORAL EVERY 6 HOURS PRN
Qty: 30 TABLET | Refills: 0 | Status: SHIPPED | OUTPATIENT
Start: 2024-05-08

## 2024-05-08 RX ORDER — ADHESIVE BANDAGE
15 BANDAGE TOPICAL DAILY PRN
Qty: 360 ML | Refills: 0 | Status: SHIPPED | OUTPATIENT
Start: 2024-05-08

## 2024-05-08 RX ORDER — CEFAZOLIN SODIUM 2 G/100ML
2 INJECTION, SOLUTION INTRAVENOUS ONCE
Status: DISCONTINUED | OUTPATIENT
Start: 2024-05-08 | End: 2024-05-08 | Stop reason: HOSPADM

## 2024-05-08 RX ORDER — CEFAZOLIN 1 G/1
INJECTION, POWDER, FOR SOLUTION INTRAVENOUS AS NEEDED
Status: DISCONTINUED | OUTPATIENT
Start: 2024-05-08 | End: 2024-05-08

## 2024-05-08 RX ORDER — KETOROLAC TROMETHAMINE 30 MG/ML
INJECTION, SOLUTION INTRAMUSCULAR; INTRAVENOUS AS NEEDED
Status: DISCONTINUED | OUTPATIENT
Start: 2024-05-08 | End: 2024-05-08

## 2024-05-08 RX ORDER — PROPOFOL 10 MG/ML
INJECTION, EMULSION INTRAVENOUS AS NEEDED
Status: DISCONTINUED | OUTPATIENT
Start: 2024-05-08 | End: 2024-05-08

## 2024-05-08 RX ORDER — MIDAZOLAM HYDROCHLORIDE 1 MG/ML
INJECTION INTRAMUSCULAR; INTRAVENOUS AS NEEDED
Status: DISCONTINUED | OUTPATIENT
Start: 2024-05-08 | End: 2024-05-08

## 2024-05-08 RX ORDER — LIDOCAINE HCL/PF 100 MG/5ML
SYRINGE (ML) INTRAVENOUS AS NEEDED
Status: DISCONTINUED | OUTPATIENT
Start: 2024-05-08 | End: 2024-05-08

## 2024-05-08 RX ORDER — ACETAMINOPHEN 325 MG/1
975 TABLET ORAL ONCE
Status: COMPLETED | OUTPATIENT
Start: 2024-05-08 | End: 2024-05-08

## 2024-05-08 RX ORDER — ONDANSETRON HYDROCHLORIDE 2 MG/ML
INJECTION, SOLUTION INTRAVENOUS AS NEEDED
Status: DISCONTINUED | OUTPATIENT
Start: 2024-05-08 | End: 2024-05-08

## 2024-05-08 RX ORDER — NORETHINDRONE AND ETHINYL ESTRADIOL 0.5-0.035
KIT ORAL AS NEEDED
Status: DISCONTINUED | OUTPATIENT
Start: 2024-05-08 | End: 2024-05-08

## 2024-05-08 RX ORDER — KETOROLAC TROMETHAMINE 10 MG/1
10 TABLET, FILM COATED ORAL EVERY 6 HOURS PRN
Qty: 20 TABLET | Refills: 0 | Status: SHIPPED | OUTPATIENT
Start: 2024-05-08

## 2024-05-08 RX ORDER — BUPIVACAINE HYDROCHLORIDE 5 MG/ML
INJECTION, SOLUTION PERINEURAL AS NEEDED
Status: DISCONTINUED | OUTPATIENT
Start: 2024-05-08 | End: 2024-05-08 | Stop reason: HOSPADM

## 2024-05-08 RX ADMIN — DEXAMETHASONE SODIUM PHOSPHATE 8 MG: 4 INJECTION INTRA-ARTICULAR; INTRALESIONAL; INTRAMUSCULAR; INTRAVENOUS; SOFT TISSUE at 07:39

## 2024-05-08 RX ADMIN — FENTANYL CITRATE 50 MCG: 50 INJECTION, SOLUTION INTRAMUSCULAR; INTRAVENOUS at 07:33

## 2024-05-08 RX ADMIN — KETOROLAC TROMETHAMINE 30 MG: 30 INJECTION, SOLUTION INTRAMUSCULAR at 10:03

## 2024-05-08 RX ADMIN — HYDROMORPHONE HYDROCHLORIDE 0.5 MG: 1 INJECTION, SOLUTION INTRAMUSCULAR; INTRAVENOUS; SUBCUTANEOUS at 10:57

## 2024-05-08 RX ADMIN — PHENAZOPYRIDINE 200 MG: 100 TABLET ORAL at 06:42

## 2024-05-08 RX ADMIN — EPHEDRINE SULFATE 5 MG: 50 INJECTION, SOLUTION INTRAVENOUS at 10:09

## 2024-05-08 RX ADMIN — ROCURONIUM BROMIDE 20 MG: 10 INJECTION, SOLUTION INTRAVENOUS at 08:22

## 2024-05-08 RX ADMIN — MIDAZOLAM HYDROCHLORIDE 2 MG: 1 INJECTION, SOLUTION INTRAMUSCULAR; INTRAVENOUS at 07:33

## 2024-05-08 RX ADMIN — SODIUM CHLORIDE, POTASSIUM CHLORIDE, SODIUM LACTATE AND CALCIUM CHLORIDE: 600; 310; 30; 20 INJECTION, SOLUTION INTRAVENOUS at 07:31

## 2024-05-08 RX ADMIN — FENTANYL CITRATE 50 MCG: 50 INJECTION, SOLUTION INTRAMUSCULAR; INTRAVENOUS at 08:04

## 2024-05-08 RX ADMIN — PROPOFOL 150 MG: 10 INJECTION, EMULSION INTRAVENOUS at 07:33

## 2024-05-08 RX ADMIN — ACETAMINOPHEN 975 MG: 325 TABLET ORAL at 06:42

## 2024-05-08 RX ADMIN — ROCURONIUM BROMIDE 10 MG: 10 INJECTION, SOLUTION INTRAVENOUS at 08:58

## 2024-05-08 RX ADMIN — EPHEDRINE SULFATE 5 MG: 50 INJECTION, SOLUTION INTRAVENOUS at 08:42

## 2024-05-08 RX ADMIN — EPHEDRINE SULFATE 5 MG: 50 INJECTION, SOLUTION INTRAVENOUS at 09:57

## 2024-05-08 RX ADMIN — EPHEDRINE SULFATE 10 MG: 50 INJECTION, SOLUTION INTRAVENOUS at 07:47

## 2024-05-08 RX ADMIN — GABAPENTIN 300 MG: 300 CAPSULE ORAL at 06:42

## 2024-05-08 RX ADMIN — SUGAMMADEX 200 MG: 100 INJECTION, SOLUTION INTRAVENOUS at 10:10

## 2024-05-08 RX ADMIN — LIDOCAINE HYDROCHLORIDE 50 MG: 20 INJECTION INTRAVENOUS at 07:33

## 2024-05-08 RX ADMIN — SODIUM CHLORIDE, POTASSIUM CHLORIDE, SODIUM LACTATE AND CALCIUM CHLORIDE: 600; 310; 30; 20 INJECTION, SOLUTION INTRAVENOUS at 09:38

## 2024-05-08 RX ADMIN — ROCURONIUM BROMIDE 50 MG: 10 INJECTION, SOLUTION INTRAVENOUS at 07:33

## 2024-05-08 RX ADMIN — SODIUM CHLORIDE, POTASSIUM CHLORIDE, SODIUM LACTATE AND CALCIUM CHLORIDE 100 ML/HR: 600; 310; 30; 20 INJECTION, SOLUTION INTRAVENOUS at 06:42

## 2024-05-08 RX ADMIN — CEFAZOLIN 2 G: 1 INJECTION, POWDER, FOR SOLUTION INTRAMUSCULAR; INTRAVENOUS at 07:34

## 2024-05-08 RX ADMIN — CELECOXIB 400 MG: 400 CAPSULE ORAL at 06:42

## 2024-05-08 RX ADMIN — ONDANSETRON 4 MG: 2 INJECTION INTRAMUSCULAR; INTRAVENOUS at 09:57

## 2024-05-08 SDOH — HEALTH STABILITY: MENTAL HEALTH: CURRENT SMOKER: 0

## 2024-05-08 ASSESSMENT — PAIN SCALES - GENERAL
PAINLEVEL_OUTOF10: 0 - NO PAIN
PAINLEVEL_OUTOF10: 0 - NO PAIN
PAINLEVEL_OUTOF10: 7
PAINLEVEL_OUTOF10: 10 - WORST POSSIBLE PAIN
PAINLEVEL_OUTOF10: 7

## 2024-05-08 ASSESSMENT — PAIN - FUNCTIONAL ASSESSMENT
PAIN_FUNCTIONAL_ASSESSMENT: 0-10

## 2024-05-08 NOTE — OP NOTE
SACROCOLPOPLEXY LAPAROSOPY, CYSTECTOMY OVARY LAPAROSCOPY (L) Operative Note     Date: 2024  OR Location: [facility] OR    Name: Lashon Murguia, : 1970, Age: 54 y.o., MRN: [id number], Sex: female    Diagnosis  Pre-op Diagnosis     * Vaginal prolapse [N81.10] Post-op Diagnosis     * Vaginal prolapse [N81.10]     Procedures  Laparoscopic sacrocolpopexy  Left ovarian cystectomy     Surgeons      * Jeff Zaldivar - Primary    Resident/Fellow/Other Assistant:  Surgeons and Role:  * No surgeons found with a matching role *    Procedure Summary  Anesthesia: Consult  ASA: II  Anesthesia Staff: Anesthesiologist: Conchita Keller MD  CRNA: LIVIER Ugarte-CRNA  Estimated Blood Loss: 10 mL  Intra-op Medications:   Administrations occurring from 0730 to 1000 on 24:   Medication Name Total Dose   lactated Ringer's infusion Cannot be calculated              Anesthesia Record               Intraprocedure I/O Totals          Intake    lactated Ringer's infusion 1000.00 mL    Total Intake 1000 mL          Specimen:   ID Type Source Tests Collected by Time   1 : DERMOID LEFT OVARY Tissue OVARY CYSTECTOMY LEFT SURGICAL PATHOLOGY EXAM Jeff Zaldivar MD 2024 0814        Staff:   Circulator: Christi Feldman RN; Christina Martinez RN  Relief Scrub: Aris Paul  Scrub Person: Joellen Ren         Drains and/or Catheters:   Urethral Catheter 16 Fr. (Active)       [REMOVED] NG/OG/Feeding Tube OG - [place] sump 18 Fr Center mouth (Removed)       Tourniquet Times:         Implants:  Implants       Type Name Action Serial No.      Surgical Mesh Sling Implant MESH, Y, VERTESSA LITE 26 X 4 X 3CM - [id number] Implanted               Findings: left ovarian dermoid, stage 2 prolapse    Indications: Lashon Murguia is an 54 y.o. female who is having surgery for Vaginal prolapse [N81.10].     The patient was seen in the preoperative area. The risks, benefits, complications, treatment options, non-operative alternatives, expected recovery and  outcomes were discussed with the patient. The possibilities of reaction to medication, pulmonary aspiration, injury to surrounding structures, bleeding, recurrent infection, the need for additional procedures, failure to diagnose a condition, and creating a complication requiring transfusion or operation were discussed with the patient. The patient concurred with the proposed plan, giving informed consent.  The site of surgery was properly noted/marked if necessary per policy. The patient has been actively warmed in preoperative area. Preoperative antibiotics have been ordered and given within 1 hours of incision. Venous thrombosis prophylaxis have been ordered including bilateral sequential compression devices    Procedure Details: Patient was taken to the operating room, prepped  and draped in usual sterile fashion after being placed in dorsal lithotomy. A Crenshaw catheter was placed in the bladder, and an EA sizer was placed in the vagina.  The abdomen was entered through the umbilicus using the open Alin technique and a 12 mm balloon port was inserted.  The abdomen  was insufflated with carbon dioxide gas to 15 mmHg of carbon dioxide gas. Two 5 mm ports were placed in the right and left lower quadrants and a 12 mm port was placed in the suprapubic site. All incisions were first injected with 1% lidocaine with epinephrine. The left ovary had a 2 cm abnormal looking cyst on it, this was amputated from the ovary and removed, the cyst was opened and had hair and sebaceous fluid in it.    The bladder was  then dissected off the anterior vaginal wall in order to  create site for mesh attachment.   The sacral promontory was identified.  The  peritoneum over the sacral promontory was grasped and entered sharply.  Careful sharp and blunt dissection were then used to develop the  presacral ligament plane.  The middle sacral artery was identified,  cauterized, and transected.  The peritoneum was then dissected in a  caudal  fashion toward the right uterosacral ligament in order to create  a space to peritonealize the mesh.  We then placed the Vertesa Light mesh into  the abdomen and attached at the anterior and posterior cervical stumps  and vagina using multiple interrupted 0 PDS sutures.  The vaginal apex  was then tensioned appropriately with the C-point 8 cm proximal to the  hymen and the mesh was attached to the sacral ligament using 2  interrupted 0 Ethibond sutures.  The peritoneum was then closed entirely  over the mesh using a running 0 Vicryl stitch.  Once this was completed,  cystoscopy was performed and spill of urine was noted bilaterally from the ureteral orifices.  The bladder was then fully evaluated and found to not have any abnormalities The bladder was drained and the Crenshaw was  replaced.  The umbilical fascia was closed using 0-vicryl stich.  The skin incisions were all closed using a running 4-0  Vicryl.   Complications:  None; patient tolerated the procedure well.    Disposition: PACU - hemodynamically stable.  Condition: stable         Additional Details:     Attending Attestation: I was present and scrubbed for the entire procedure.    Jeff Zaldivar  Phone Number: [phone]

## 2024-05-08 NOTE — ANESTHESIA PROCEDURE NOTES
Airway  Date/Time: 5/8/2024 7:38 AM  Urgency: elective    Airway not difficult    Staffing  Performed: CRNA   Authorized by: Conchita Keller MD    Performed by: LIVIER Ugarte-QING  Patient location during procedure: OR    Indications and Patient Condition  Indications for airway management: anesthesia  Spontaneous Ventilation: absent  Sedation level: deep  Preoxygenated: yes  Patient position: sniffing  Mask difficulty assessment: 1 - vent by mask    Final Airway Details  Final airway type: endotracheal airway      Successful airway: ETT  Cuffed: yes   Successful intubation technique: video laryngoscopy  Facilitating devices/methods: intubating stylet  Endotracheal tube insertion site: oral  Blade type: Vestec.  Blade size: #4  ETT size (mm): 7.0  Cormack-Lehane Classification: grade I - full view of glottis  Placement verified by: chest auscultation, capnometry and palpation of cuff   Cuff volume (mL): 10  Measured from: lips  ETT to lips (cm): 23  Number of attempts at approach: 1    Additional Comments  Lips and teeth remain in pre-anesthetic condition s/p intubation.

## 2024-05-08 NOTE — ANESTHESIA POSTPROCEDURE EVALUATION
Patient: Lashon Murguia    Procedure Summary       Date: 05/08/24 Room / Location: [facility] OR 07 / [facility]    Anesthesia Start: 0731 Anesthesia Stop: 1032    Procedures:       SACROCOLPOPLEXY LAPAROSOPY      CYSTECTOMY OVARY LAPAROSCOPY (Left)      Cystoscopy Rigid Diagnosis:       Vaginal prolapse      (Vaginal prolapse [N81.10])    Surgeons: Jeff Zaldivar MD Responsible Provider: Conchita Keller MD    Anesthesia Type: general ASA Status: 2            Anesthesia Type: general    Vitals Value Taken Time   /60 05/08/24 1056   Temp 36 °C (96.8 °F) 05/08/24 1026   Pulse 68 05/08/24 1056   Resp 18 05/08/24 1056   SpO2 100 % 05/08/24 1056       Anesthesia Post Evaluation    Patient location during evaluation: PACU  Patient participation: complete - patient participated  Level of consciousness: awake and alert  Pain management: adequate  Airway patency: patent  Cardiovascular status: acceptable  Respiratory status: acceptable  Hydration status: acceptable  Postoperative Nausea and Vomiting: none        There were no known notable events for this encounter.

## 2024-05-08 NOTE — ANESTHESIA PREPROCEDURE EVALUATION
Patient: Lashon Murguia    Procedure Information       Date/Time: 05/08/24 0730    Procedure: COLPOSUSPENSION LAPAROSOPY    Location: [facility] OR [facility] / [facility]    Surgeons: Jeff Zaldivar MD            Relevant Problems   Anesthesia (within normal limits)   (-) PONV (postoperative nausea and vomiting)      Cardiac   (+) HTN (hypertension), benign      Pulmonary (within normal limits)      Neuro (within normal limits)      GI   (+) Acid reflux      /Renal (within normal limits)      Liver (within normal limits)      HEENT   (+) Seasonal allergies       Clinical information reviewed:   Tobacco  Allergies  Meds   Med Hx  Surg Hx  OB Status  Fam Hx  Soc   Hx        Vitals:    05/08/24 0636   BP: (!) 113/47   Pulse: 66   Resp: 16   Temp: 36.4 °C (97.5 °F)   SpO2: 100%       Past Surgical History:   Procedure Laterality Date    BREAST LUMPECTOMY      HYSTERECTOMY  09/19/2016    Hysterectomy    MOLE REMOVAL      TONSILLECTOMY  09/19/2016    Tonsillectomy    TUBAL LIGATION  09/19/2016    Tubal Ligation    WISDOM TOOTH EXTRACTION       Past Medical History:   Diagnosis Date    Body mass index (BMI) 23.0-23.9, adult 05/18/2021    Body mass index (BMI) of 23.0 to 23.9 in adult    GERD (gastroesophageal reflux disease)     History of Anemia 10/10/2017    History of anemia    HTN (hypertension)     Idiopathic scoliosis, site unspecified     Scoliosis (and kyphoscoliosis), idiopathic    Personal history of other medical treatment 10/10/2017    History of blood transfusion    Vaginal prolapse        Current Facility-Administered Medications:     ceFAZolin in dextrose (iso-os) (Ancef) IVPB 2 g, 2 g, intravenous, Once, Jeff Zaldivar MD    lactated Ringer's infusion, 100 mL/hr, intravenous, Continuous, Sukh Valverde MD, Last Rate: 100 mL/hr at 05/08/24 0708, Continued by Anesthesia at 05/08/24 0708  Prior to Admission medications    Medication Sig Start Date End Date Taking? Authorizing Provider   omeprazole (PriLOSEC) 20 mg  "DR capsule Take 1 capsule (20 mg) by mouth once daily. 11/14/23 11/13/24 Yes Kyleigh Kwan MD   tamsulosin (Flomax) 0.4 mg 24 hr capsule Take 3 days before surgery and 7 days after 4/23/24  Yes Jeff Zaldivar MD   triamterene-hydrochlorothiazid (Maxzide-25) 37.5-25 mg tablet Take 1 tablet by mouth once daily. 11/14/23 11/13/24 Yes Kyleigh Kwan MD   potassium chloride ER (Micro-K) 10 mEq ER capsule Take 2 capsules (20 mEq) by mouth once daily. Do not crush or chew.  Patient not taking: Reported on 5/8/2024 12/30/23 12/29/24  Kyleigh Kwan MD     No Known Allergies  Social History     Tobacco Use    Smoking status: Never    Smokeless tobacco: Never   Substance Use Topics    Alcohol use: Not Currently         Chemistry    Lab Results   Component Value Date/Time     11/24/2023 0708    K 3.4 (L) 11/24/2023 0708     11/24/2023 0708    CO2 28 11/24/2023 0708    BUN 22 11/24/2023 0708    CREATININE 0.66 11/24/2023 0708    Lab Results   Component Value Date/Time    CALCIUM 8.5 (L) 11/24/2023 0708    ALKPHOS 59 11/24/2023 0708    AST 9 11/24/2023 0708    ALT 6 (L) 11/24/2023 0708    BILITOT 0.3 11/24/2023 0708          Lab Results   Component Value Date/Time    WBC 6.9 11/24/2023 0708    HGB 11.5 (L) 11/24/2023 0708    HCT 35.3 (L) 11/24/2023 0708     11/24/2023 0708     No results found for: \"PROTIME\", \"PTT\", \"INR\"  No results found for this or any previous visit (from the past 4464 hour(s)).  No results found for this or any previous visit from the past 1095 days.    NPO Detail:  NPO/Void Status  Carbohydrate Drink Given Prior to Surgery? : N  Date of Last Liquid: 05/07/24  Time of Last Liquid: 2000  Date of Last Solid: 05/07/24  Time of Last Solid: 1700  Last Intake Type: Clear fluids  Time of Last Void: 0600         Physical Exam    Airway  Mallampati: II  TM distance: <3 FB  Neck ROM: full     Cardiovascular   Rhythm: regular  Rate: normal     Dental - normal exam     Pulmonary "   Breath sounds clear to auscultation     Abdominal - normal exam             Anesthesia Plan    History of general anesthesia?: yes  History of complications of general anesthesia?: no    ASA 2     general     The patient is not a current smoker.    intravenous induction   Postoperative administration of opioids is intended.  Trial extubation is planned.  Anesthetic plan and risks discussed with patient.  Use of blood products discussed with patient who consented to blood products.    Plan discussed with CRNA.

## 2024-05-15 LAB
LABORATORY COMMENT REPORT: NORMAL
PATH REPORT.FINAL DX SPEC: NORMAL
PATH REPORT.GROSS SPEC: NORMAL
PATH REPORT.TOTAL CANCER: NORMAL

## 2024-05-21 NOTE — PROGRESS NOTES
[facility]  Outpatient Clinic Note    Patient: Lashon Murguia  Age/Sex: 54 y.o., female  MRN: [id number]  Virtual Visit: An interactive audio and video telecommunication system which permits real time communications between the patient (at the originating site) and provider (at the distant site) was utilized to provide this telehealth service. Verbal consent was requested and obtained from Lashon Murguia on this date 05/22/2024 for a telehealth visit.     Chief Complaint:  2 week post op         History of Present Illness  This is a 54 y.o. female, who presents urgently for the clinic for her 2-week postop visit.  The patient underwent a sacrocolpopexy with Dr. Zaldivar on May 8, 2024. She reports she has been doing well since surgery. She is eating and drinking, as normal. Urine has remained clear, yellow. Bowels have returned to normal. No drainage from incision site. She is ambulating at baseline. No fevers or chills. 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    Past Medical & Surgical History  Past Medical History:   Diagnosis Date    Body mass index (BMI) 23.0-23.9, adult 05/18/2021    Body mass index (BMI) of 23.0 to 23.9 in adult    GERD (gastroesophageal reflux disease)     History of Anemia 10/10/2017    History of anemia    HTN (hypertension)     Idiopathic scoliosis, site unspecified     Scoliosis (and kyphoscoliosis), idiopathic    Personal history of other medical treatment 10/10/2017    History of blood transfusion    Vaginal prolapse      Past Surgical History:   Procedure Laterality Date    BREAST LUMPECTOMY      HYSTERECTOMY  09/19/2016    Hysterectomy    MOLE REMOVAL      TONSILLECTOMY  09/19/2016    Tonsillectomy    TUBAL LIGATION  09/19/2016    Tubal Ligation    WISDOM TOOTH EXTRACTION         Family History  No family history on file.    Social History  She reports that she has never smoked. She has never used smokeless tobacco. She reports that she does not  currently use alcohol. She reports that she does not use drugs.    Allergies  Patient has no known allergies.    Medications:  Current Outpatient Medications on File Prior to Visit   Medication Sig Dispense Refill    acetaminophen (Tylenol) 500 mg tablet Take 2 tablets (1,000 mg) by mouth every 6 hours if needed for mild pain (1 - 3). 30 tablet 0    docusate sodium (Colace) 100 mg capsule Take 1 capsule (100 mg) by mouth 2 times a day. 60 capsule 0    ketorolac (Toradol) 10 mg tablet Take 1 tablet (10 mg) by mouth every 6 hours if needed for moderate pain (4 - 6). 20 tablet 0    magnesium hydroxide (Milk of Magnesia) 400 mg/5 mL suspension Take 15 mL by mouth once daily as needed for constipation (if have not had a BM by postoperative day 3). 360 mL 0    omeprazole (PriLOSEC) 20 mg DR capsule Take 1 capsule (20 mg) by mouth once daily. 90 capsule 3    polyethylene glycol (Glycolax, Miralax) 17 gram/dose powder Take 17 g by mouth once daily. 510 g 0    potassium chloride ER (Micro-K) 10 mEq ER capsule Take 2 capsules (20 mEq) by mouth once daily. Do not crush or chew. 60 capsule 11    tamsulosin (Flomax) 0.4 mg 24 hr capsule Take 3 days before surgery and 7 days after 10 capsule 0    traMADol (Ultram) 50 mg tablet Take 1 tablet (50 mg) by mouth every 6 hours if needed for severe pain (7 - 10). 15 tablet 0    triamterene-hydrochlorothiazid (Maxzide-25) 37.5-25 mg tablet Take 1 tablet by mouth once daily. 90 tablet 3     No current facility-administered medications on file prior to visit.        Review of Systems   A comprehensive 10+ review of systems was negative except for: see hpi          Physical Exam                                                                                                                      General: Well developed, well nourished, alert and cooperative, appears in no acute distress  Eyes: no proptosis  Lungs: Breathing is easy, non-labored while speaking in clear and complete sentences.    Neuro: alert and oriented to person, place and time  Psych: Demonstrates good judgement and reason, without hallucinations, abnormal affect or abnormal behaviors.  Skin: no obvious lesions, no rashes      Labs  N/A    Imaging  N/A    IMPRESSION AND PLAN:  Lashon Murguia is a 54 y.o. with stage 2 Vaginal Prolapse     POP  -UDS negative, will not need sling   -sacrocolpopexy with Dr. Zaldivar on May 8, 2024.   -Doing well, would like to go back to work    Follow up with Dr. Zaldivar for your 6 week post op visit on 6/4    All questions and concerns were answered and addressed.  The patient expressed understanding and agrees with the plan.     Reviewed and approved by PABLITO PIÑA on 5/22/24 at 11:57 AM.

## 2024-05-22 ENCOUNTER — TELEMEDICINE (OUTPATIENT)
Dept: UROLOGY | Facility: CLINIC | Age: 54
End: 2024-05-22
Payer: COMMERCIAL

## 2024-05-22 DIAGNOSIS — N81.10 VAGINAL PROLAPSE: ICD-10-CM

## 2024-05-22 DIAGNOSIS — Z09 POSTOP CHECK: Primary | ICD-10-CM

## 2024-05-22 PROCEDURE — 99024 POSTOP FOLLOW-UP VISIT: CPT

## 2024-05-22 PROCEDURE — 1036F TOBACCO NON-USER: CPT

## 2024-06-04 ENCOUNTER — APPOINTMENT (OUTPATIENT)
Dept: UROLOGY | Facility: CLINIC | Age: 54
End: 2024-06-04
Payer: COMMERCIAL

## 2024-06-11 ENCOUNTER — OFFICE VISIT (OUTPATIENT)
Dept: UROLOGY | Facility: CLINIC | Age: 54
End: 2024-06-11
Payer: COMMERCIAL

## 2024-06-11 DIAGNOSIS — Z09 POSTOP CHECK: Primary | ICD-10-CM

## 2024-06-11 PROCEDURE — 99024 POSTOP FOLLOW-UP VISIT: CPT | Performed by: STUDENT IN AN ORGANIZED HEALTH CARE EDUCATION/TRAINING PROGRAM

## 2024-06-11 NOTE — PROGRESS NOTES
"HISTORY OF PRESENT ILLNESS:  Lashon Murguia is a 54 y.o. female who presents today 6 weeks post op. She reports she is happy and doing well. She denies any issues.          Past Medical History  She has a past medical history of Body mass index (BMI) 23.0-23.9, adult (05/18/2021), GERD (gastroesophageal reflux disease), History of Anemia (10/10/2017), HTN (hypertension), Idiopathic scoliosis, site unspecified, Personal history of other medical treatment (10/10/2017), and Vaginal prolapse.    Surgical History  She has a past surgical history that includes Tubal ligation (09/19/2016); Hysterectomy (09/19/2016); Tonsillectomy (09/19/2016); [place] tooth extraction; Breast lumpectomy; and Mole removal.     Social History  She reports that she has never smoked. She has never used smokeless tobacco. She reports that she does not currently use alcohol. She reports that she does not use drugs.    Family History  No family history on file.     Allergies  Patient has no known allergies.      A comprehensive 10+ review of systems was negative except for: see hpi                          PHYSICAL EXAMINATION:  BP Readings from Last 3 Encounters:   05/08/24 110/62   01/09/24 136/84   11/16/23 110/70      Wt Readings from Last 3 Encounters:   05/08/24 58 kg (127 lb 13.9 oz)   11/16/23 57.2 kg (126 lb)   11/14/23 58.1 kg (128 lb)      BMI: Estimated body mass index is 21.28 kg/m² as calculated from the following:    Height as of 5/8/24: 1.651 m (5' 5\").    Weight as of 5/8/24: 58 kg (127 lb 13.9 oz).  BSA: Estimated body surface area is 1.63 meters squared as calculated from the following:    Height as of 5/8/24: 1.651 m (5' 5\").    Weight as of 5/8/24: 58 kg (127 lb 13.9 oz).  HEENT: Normocephalic, atraumatic, PER EOMI, nonicteric, trachea normal, thyroid normal, oropharynx normal.  CARDIAC: regular rate & rhythm, S1 & S2 normal.  No heaves, thrills, gallops or murmurs.  LUNGS: Clear to auscultation, no spinal or CV " tenderness.  EXTREMITIES: No evidence of cyanosis, clubbing or edema.       Stage 0 pop         Assessment:  Lashon Murguia is a 54 y.o. with stage 2 Vaginal Prolapse     POP  -UDS negative, will not need sling   -S/P sacrocolpopexy May 8, 2024, doing well   -Doing well  -Follow up in 1 year       All questions and concerns were answered and addressed.  The patient expressed understanding and agrees with the plan.     Jeff Zaldivar MD    Scribe Attestation  By signing my name below, I, Louie Castaneda   attest that this documentation has been prepared under the direction and in the presence of Jeff Zaldivar MD.

## 2024-07-08 ENCOUNTER — LAB (OUTPATIENT)
Dept: LAB | Facility: LAB | Age: 54
End: 2024-07-08
Payer: COMMERCIAL

## 2024-07-08 ENCOUNTER — TELEPHONE (OUTPATIENT)
Dept: UROLOGY | Facility: CLINIC | Age: 54
End: 2024-07-08

## 2024-07-08 DIAGNOSIS — N39.0 RECURRENT UTI: ICD-10-CM

## 2024-07-08 DIAGNOSIS — N39.0 RECURRENT UTI: Primary | ICD-10-CM

## 2024-07-08 PROCEDURE — 87086 URINE CULTURE/COLONY COUNT: CPT

## 2024-07-08 NOTE — TELEPHONE ENCOUNTER
Patient thinks she has a UTI, she is a patient of [facility], can you please put a order in for her to have a culture done.  Thank you

## 2024-07-09 LAB — BACTERIA UR CULT: NO GROWTH

## 2024-10-18 ENCOUNTER — APPOINTMENT (OUTPATIENT)
Dept: UROLOGY | Facility: CLINIC | Age: 54
End: 2024-10-18
Payer: COMMERCIAL

## 2024-12-22 ASSESSMENT — PROMIS GLOBAL HEALTH SCALE
CARRYOUT_SOCIAL_ACTIVITIES: VERY GOOD
RATE_SOCIAL_SATISFACTION: GOOD
RATE_QUALITY_OF_LIFE: VERY GOOD
RATE_PHYSICAL_HEALTH: VERY GOOD
EMOTIONAL_PROBLEMS: OFTEN
RATE_MENTAL_HEALTH: FAIR
RATE_AVERAGE_FATIGUE: MILD
RATE_AVERAGE_PAIN: 0
RATE_GENERAL_HEALTH: VERY GOOD
CARRYOUT_PHYSICAL_ACTIVITIES: COMPLETELY

## 2024-12-24 ENCOUNTER — APPOINTMENT (OUTPATIENT)
Dept: PRIMARY CARE | Facility: CLINIC | Age: 54
End: 2024-12-24
Payer: COMMERCIAL

## 2024-12-24 VITALS
HEART RATE: 84 BPM | WEIGHT: 128 LBS | OXYGEN SATURATION: 98 % | BODY MASS INDEX: 21.33 KG/M2 | SYSTOLIC BLOOD PRESSURE: 124 MMHG | HEIGHT: 65 IN | RESPIRATION RATE: 16 BRPM | DIASTOLIC BLOOD PRESSURE: 76 MMHG

## 2024-12-24 DIAGNOSIS — Z00.00 ROUTINE GENERAL MEDICAL EXAMINATION AT A HEALTH CARE FACILITY: Primary | ICD-10-CM

## 2024-12-24 DIAGNOSIS — K59.09 CHRONIC CONSTIPATION: ICD-10-CM

## 2024-12-24 PROBLEM — M79.89 PAIN AND SWELLING OF LEFT LOWER EXTREMITY: Status: RESOLVED | Noted: 2023-11-14 | Resolved: 2024-12-24

## 2024-12-24 PROBLEM — N60.81: Status: ACTIVE | Noted: 2019-02-27

## 2024-12-24 PROBLEM — R92.8 ABNORMAL MAMMOGRAM: Status: ACTIVE | Noted: 2024-12-24

## 2024-12-24 PROBLEM — I87.2 VENOUS INSUFFICIENCY: Status: ACTIVE | Noted: 2024-12-24

## 2024-12-24 PROBLEM — R30.0 DYSURIA: Status: RESOLVED | Noted: 2024-12-24 | Resolved: 2024-12-24

## 2024-12-24 PROBLEM — J40 BRONCHITIS: Status: ACTIVE | Noted: 2024-12-24

## 2024-12-24 PROBLEM — N39.0 URINARY TRACT INFECTION: Status: ACTIVE | Noted: 2024-12-24

## 2024-12-24 PROBLEM — M79.605 PAIN AND SWELLING OF LEFT LOWER EXTREMITY: Status: RESOLVED | Noted: 2023-11-14 | Resolved: 2024-12-24

## 2024-12-24 PROBLEM — Z86.2 HISTORY OF ANEMIA: Status: RESOLVED | Noted: 2024-12-24 | Resolved: 2024-12-24

## 2024-12-24 PROBLEM — I10 HTN (HYPERTENSION), BENIGN: Status: RESOLVED | Noted: 2023-11-14 | Resolved: 2024-12-24

## 2024-12-24 PROBLEM — M41.9 IDIOPATHIC KYPHOSCOLIOSIS: Status: ACTIVE | Noted: 2024-12-24

## 2024-12-24 PROBLEM — J01.90 ACUTE SINUSITIS: Status: RESOLVED | Noted: 2024-12-24 | Resolved: 2024-12-24

## 2024-12-24 PROBLEM — G57.12 MERALGIA PARESTHETICA OF LEFT SIDE: Status: ACTIVE | Noted: 2024-12-24

## 2024-12-24 PROCEDURE — 3008F BODY MASS INDEX DOCD: CPT | Performed by: FAMILY MEDICINE

## 2024-12-24 PROCEDURE — 99396 PREV VISIT EST AGE 40-64: CPT | Performed by: FAMILY MEDICINE

## 2024-12-24 PROCEDURE — 1036F TOBACCO NON-USER: CPT | Performed by: FAMILY MEDICINE

## 2024-12-24 ASSESSMENT — ENCOUNTER SYMPTOMS
LOSS OF SENSATION IN FEET: 0
OCCASIONAL FEELINGS OF UNSTEADINESS: 0
DEPRESSION: 0
CONSTIPATION: 1

## 2024-12-24 ASSESSMENT — PATIENT HEALTH QUESTIONNAIRE - PHQ9
SUM OF ALL RESPONSES TO PHQ9 QUESTIONS 1 AND 2: 0
1. LITTLE INTEREST OR PLEASURE IN DOING THINGS: NOT AT ALL
2. FEELING DOWN, DEPRESSED OR HOPELESS: NOT AT ALL

## 2024-12-24 ASSESSMENT — ANXIETY QUESTIONNAIRES
7. FEELING AFRAID AS IF SOMETHING AWFUL MIGHT HAPPEN: NOT AT ALL
GAD7 TOTAL SCORE: 0
4. TROUBLE RELAXING: NOT AT ALL
3. WORRYING TOO MUCH ABOUT DIFFERENT THINGS: NOT AT ALL
5. BEING SO RESTLESS THAT IT IS HARD TO SIT STILL: NOT AT ALL
1. FEELING NERVOUS, ANXIOUS, OR ON EDGE: NOT AT ALL
2. NOT BEING ABLE TO STOP OR CONTROL WORRYING: NOT AT ALL
IF YOU CHECKED OFF ANY PROBLEMS ON THIS QUESTIONNAIRE, HOW DIFFICULT HAVE THESE PROBLEMS MADE IT FOR YOU TO DO YOUR WORK, TAKE CARE OF THINGS AT HOME, OR GET ALONG WITH OTHER PEOPLE: NOT DIFFICULT AT ALL
6. BECOMING EASILY ANNOYED OR IRRITABLE: NOT AT ALL

## 2024-12-24 ASSESSMENT — COLUMBIA-SUICIDE SEVERITY RATING SCALE - C-SSRS
2. HAVE YOU ACTUALLY HAD ANY THOUGHTS OF KILLING YOURSELF?: NO
6. HAVE YOU EVER DONE ANYTHING, STARTED TO DO ANYTHING, OR PREPARED TO DO ANYTHING TO END YOUR LIFE?: NO
1. IN THE PAST MONTH, HAVE YOU WISHED YOU WERE DEAD OR WISHED YOU COULD GO TO SLEEP AND NOT WAKE UP?: NO

## 2024-12-24 NOTE — PROGRESS NOTES
Subjective   Lashon Murguia is a 54 y.o. female and is here for a comprehensive physical exam. The patient reports problems - Surgery May 2024 for vaginal prolapse. , and now is very constipated. Not sure of from that. Is trying to increase fiber and greens, takes laxative four colace with laxative.  Remote hysterectomy one ovary, no hot flashes. Has weaned off triamterene. She is taking papaya enzymes, chewing and is now off omeprazole.   Last physical:   Changes yes See above  Concerns no  Dentist yes  Vision no  Hearing Problems yes  Diet yes  Exercise no Active at work with steps   Alcohol no  Drugs no  Social History     Tobacco Use   Smoking Status Never   Smokeless Tobacco Never          Do you take any herbs or supplements that were not prescribed by a doctor? yes Papaya enzyme  Are you taking calcium supplements? no  Are you taking aspirin daily? no      History:  LMP: No LMP recorded. Patient has had a hysterectomy.  Menopause at n/a years  Last pap date: n/a  Abnormal pap? no  : 3  Para: 3    Do you have pain that bothers you in your daily life? no  Review of Systems   Gastrointestinal:  Positive for constipation.   Genitourinary:  Positive for enuresis.        Resolved with surgery        Objective   Physical Exam  Vitals reviewed.   Constitutional:       Appearance: Normal appearance.   HENT:      Head: Normocephalic and atraumatic.      Right Ear: Tympanic membrane normal.      Left Ear: Tympanic membrane normal.      Nose: Nose normal.      Mouth/Throat:      Mouth: Mucous membranes are moist.      Pharynx: Oropharynx is clear.   Eyes:      Extraocular Movements: Extraocular movements intact.      Conjunctiva/sclera: Conjunctivae normal.      Pupils: Pupils are equal, round, and reactive to light.   Cardiovascular:      Rate and Rhythm: Normal rate and regular rhythm.      Pulses: Normal pulses.      Heart sounds: Normal heart sounds.   Pulmonary:      Effort: Pulmonary effort is normal.       Breath sounds: Normal breath sounds.   Abdominal:      General: Abdomen is flat. Bowel sounds are normal.      Palpations: Abdomen is soft.   Musculoskeletal:         General: Normal range of motion.      Cervical back: Normal range of motion and neck supple.   Skin:     General: Skin is warm and dry.      Capillary Refill: Capillary refill takes less than 2 seconds.   Neurological:      General: No focal deficit present.      Mental Status: She is alert and oriented to person, place, and time.   Psychiatric:         Mood and Affect: Mood normal.         Behavior: Behavior normal.         Assessment/Plan   Healthy female exam. 54 year old female for annual exam. Mammogram reordered, will order labwork. Chronic constipation. Will try Linzess as she has failed stool softener and laxative,.      1. Healthy exam,   2. Patient Counseling:  --Nutrition: Stressed importance of moderation in sodium/caffeine intake, saturated fat and cholesterol, caloric balance, sufficient intake of fresh fruits, vegetables, fiber, calcium, iron, and 1 mg of folate supplement per day (for females capable of pregnancy).  --Discussed the issue of estrogen replacement, calcium supplement, and the daily use of baby aspirin.  --Exercise: Stressed the importance of regular exercise.   --Substance Abuse: Discussed cessation/primary prevention of tobacco, alcohol, or other drug use; driving or other dangerous activities under the influence; availability of treatment for abuse.    --Sexuality: Discussed sexually transmitted diseases, partner selection, use of condoms, avoidance of unintended pregnancy  and contraceptive alternatives.   --Injury prevention: Discussed safety belts, safety helmets, smoke detector, smoking near bedding or upholstery.   --Dental health: Discussed importance of regular tooth brushing, flossing, and dental visits.  --Immunizations reviewed.  --Discussed benefits of screening colonoscopy.  --After hours service discussed  with patient  3. Discussed the patient's BMI with her.  The BMI is in the acceptable range.  4. Follow up in one year

## 2024-12-24 NOTE — PATIENT INSTRUCTIONS
Continue bowel regimen.   Handouts on fiber and constipation  I added Linzess, to take daily,   Water (64 ounces per day)

## 2024-12-28 ENCOUNTER — LAB (OUTPATIENT)
Dept: LAB | Facility: LAB | Age: 54
End: 2024-12-28
Payer: COMMERCIAL

## 2024-12-28 DIAGNOSIS — Z00.00 ROUTINE GENERAL MEDICAL EXAMINATION AT A HEALTH CARE FACILITY: ICD-10-CM

## 2024-12-28 LAB
ALBUMIN SERPL BCP-MCNC: 3.9 G/DL (ref 3.4–5)
ALP SERPL-CCNC: 58 U/L (ref 33–110)
ALT SERPL W P-5'-P-CCNC: 8 U/L (ref 7–45)
ANION GAP SERPL CALC-SCNC: 9 MMOL/L (ref 10–20)
AST SERPL W P-5'-P-CCNC: 12 U/L (ref 9–39)
BASOPHILS # BLD AUTO: 0.05 X10*3/UL (ref 0–0.1)
BASOPHILS NFR BLD AUTO: 0.9 %
BILIRUB SERPL-MCNC: 0.4 MG/DL (ref 0–1.2)
BUN SERPL-MCNC: 14 MG/DL (ref 6–23)
CALCIUM SERPL-MCNC: 8.8 MG/DL (ref 8.6–10.3)
CHLORIDE SERPL-SCNC: 108 MMOL/L (ref 98–107)
CHOLEST SERPL-MCNC: 173 MG/DL (ref 0–199)
CHOLESTEROL/HDL RATIO: 3.2
CO2 SERPL-SCNC: 27 MMOL/L (ref 21–32)
CREAT SERPL-MCNC: 0.66 MG/DL (ref 0.5–1.05)
EGFRCR SERPLBLD CKD-EPI 2021: >90 ML/MIN/1.73M*2
EOSINOPHIL # BLD AUTO: 0.16 X10*3/UL (ref 0–0.7)
EOSINOPHIL NFR BLD AUTO: 3 %
ERYTHROCYTE [DISTWIDTH] IN BLOOD BY AUTOMATED COUNT: 12.1 % (ref 11.5–14.5)
GLUCOSE SERPL-MCNC: 89 MG/DL (ref 74–99)
HCT VFR BLD AUTO: 39 % (ref 36–46)
HDLC SERPL-MCNC: 54.6 MG/DL
HGB BLD-MCNC: 12.8 G/DL (ref 12–16)
IMM GRANULOCYTES # BLD AUTO: 0.01 X10*3/UL (ref 0–0.7)
IMM GRANULOCYTES NFR BLD AUTO: 0.2 % (ref 0–0.9)
LDLC SERPL CALC-MCNC: 105 MG/DL
LYMPHOCYTES # BLD AUTO: 2.5 X10*3/UL (ref 1.2–4.8)
LYMPHOCYTES NFR BLD AUTO: 46.1 %
MCH RBC QN AUTO: 30.5 PG (ref 26–34)
MCHC RBC AUTO-ENTMCNC: 32.8 G/DL (ref 32–36)
MCV RBC AUTO: 93 FL (ref 80–100)
MONOCYTES # BLD AUTO: 0.4 X10*3/UL (ref 0.1–1)
MONOCYTES NFR BLD AUTO: 7.4 %
NEUTROPHILS # BLD AUTO: 2.3 X10*3/UL (ref 1.2–7.7)
NEUTROPHILS NFR BLD AUTO: 42.4 %
NON HDL CHOLESTEROL: 118 MG/DL (ref 0–149)
NRBC BLD-RTO: 0 /100 WBCS (ref 0–0)
PLATELET # BLD AUTO: 272 X10*3/UL (ref 150–450)
POTASSIUM SERPL-SCNC: 4.1 MMOL/L (ref 3.5–5.3)
PROT SERPL-MCNC: 6.2 G/DL (ref 6.4–8.2)
RBC # BLD AUTO: 4.2 X10*6/UL (ref 4–5.2)
SODIUM SERPL-SCNC: 140 MMOL/L (ref 136–145)
TRIGL SERPL-MCNC: 69 MG/DL (ref 0–149)
TSH SERPL-ACNC: 1.98 MIU/L (ref 0.44–3.98)
VLDL: 14 MG/DL (ref 0–40)
WBC # BLD AUTO: 5.4 X10*3/UL (ref 4.4–11.3)

## 2024-12-28 PROCEDURE — 80061 LIPID PANEL: CPT

## 2024-12-28 PROCEDURE — 84443 ASSAY THYROID STIM HORMONE: CPT

## 2024-12-28 PROCEDURE — 85025 COMPLETE CBC W/AUTO DIFF WBC: CPT

## 2024-12-28 PROCEDURE — 80053 COMPREHEN METABOLIC PANEL: CPT

## 2024-12-31 ENCOUNTER — TELEPHONE (OUTPATIENT)
Dept: PRIMARY CARE | Facility: CLINIC | Age: 54
End: 2024-12-31
Payer: COMMERCIAL

## 2024-12-31 NOTE — TELEPHONE ENCOUNTER
----- Message from Kyleigh Kwan sent at 12/30/2024  9:06 PM EST -----  Annual lab work looked great. Everything was essentially with a normal limits and I have no concerns. Plan on repeating annually..

## 2025-01-14 ENCOUNTER — OFFICE VISIT (OUTPATIENT)
Dept: URGENT CARE | Age: 55
End: 2025-01-14
Payer: COMMERCIAL

## 2025-01-14 ENCOUNTER — ANCILLARY PROCEDURE (OUTPATIENT)
Dept: URGENT CARE | Age: 55
End: 2025-01-14
Payer: COMMERCIAL

## 2025-01-14 VITALS
OXYGEN SATURATION: 98 % | TEMPERATURE: 98.1 F | RESPIRATION RATE: 16 BRPM | DIASTOLIC BLOOD PRESSURE: 77 MMHG | SYSTOLIC BLOOD PRESSURE: 129 MMHG | HEART RATE: 63 BPM

## 2025-01-14 DIAGNOSIS — S90.31XA CONTUSION OF RIGHT FOOT, INITIAL ENCOUNTER: Primary | ICD-10-CM

## 2025-01-14 DIAGNOSIS — S90.31XA CONTUSION OF RIGHT FOOT, INITIAL ENCOUNTER: ICD-10-CM

## 2025-01-14 NOTE — PATIENT INSTRUCTIONS
Recommended NSAIDS, ibuprofen and tylenol over the counter for pain    Rest, Ice Elevate when you can    Elevate and ice 2-3 times a day for 15 min    Call occupational health to schedule follow up.     Negative xray on wet read, will confirm with radiology read, results will be in mychart.

## 2025-01-14 NOTE — PROGRESS NOTES
Patient presents for [facility] injury sustained at work today around 12:30 pm. 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 When grabbing the top on one of the other 2 rolls fell directly onto right her foot. She explains that she had sudden bruising and swelling. Pain has increased with walking throughout the day. Denies numbness, tingling. Explains that swelling has somewhat improved.       Injury      Physical Exam  Musculoskeletal:      Right ankle: Normal. No swelling. No tenderness. Normal range of motion. Normal pulse.      Right foot: Normal range of motion and normal capillary refill. Tenderness present. No swelling or laceration. Normal pulse.      Comments: Tenderness generalized to right dorsolateral proximal foot and proximal 5th metatarsal surface.    Skin:     General: Skin is warm.      Capillary Refill: Capillary refill takes less than 2 seconds.      Findings: Bruising and ecchymosis present. No wound.      Comments: Ecchymosis across dorsolateral proximal right foot.            _____________MEDCO-14 Physician's Report of Work Ability [facility]-[id number]_________________      Injured Worker:  Date of injury: Claim number:   Lashon Murguia 1/14/25 froi     Date of last appointment /examination: Date of this appointment /examination:  Date of next appointment /examination:   froi   01/14/25 1/20/25     Employer name: Injured worker's position of employment at time of injury:           MEDCO-14 submission   1.  Please select one of the following options: I have never completed a MEDCO-14. Proceed to section 2.         Employment/Occupation (Complete this section and proceed to section 3)  2. Updates made to Employment/Occupation Section: Yes    Have you reviewed the description of the injured worker's job held on the date of the injury (former position of employment)? Yes, job description provided by: Injured worker       Work Status/Injured worker's  capabilities.   3a. Updates made to work status/injured worker's capabilities: Yes    Does the injured worker have any physical or health restrictions related to allowed conditions in the claim? Yes, the restrictions are temporary - Proceed to section 3B.    3b. If restrictions, can the injured worker return to full duties of his/her job held on the date of injury (former position of employment)?     No. The injured worker could not do the job held on the date of the injury for this period of restricted duty. Date: 1/14/25    Estimate of when the injured worker should be able to return to the job held on the date of injury for this period of restricted duty. Date: 1/20/25  Proceed to section 3C.    3c. Please indicate which of the activities listed below the injured worker can perform (even if the response to 3B is No.)     The injured worker is not released to the former position of employment but may return to available and appropriate work with restrictions, the possible return to work date: 1/14/25    The injured worker can perform simple grasping with: Both  The injured worker can perform repetitive wrist motion with: Both  The injured worker's dominant hand is: Right  The injured worker can perform repetitive actions to operate foot controls or motor vehicles with:     If the injured worker is taking prescribed medications for the allowed conditions in this claim, can the injured worker safely:   *Operate heavy machinery: Yes  *Drive: Yes  *Perform other critical job tasks as defined by any source listed above in section 2: No     Please indicate the following: Never, Occasionally, Frequently, Continuously    Activity   Bend: Occasionally  Squat / kneel: Occasionally  Twist / turn: Occasionally  Climb: Occasionally Reach above shoulder: Continuously  Type / Keyboard: Continuously  Work w/cold substances: Continuously  Work w/hot substances: Continuously      Lifting/Carrying                                      " Pushing/pulling  0 - 10 lbs: Occasionally  11 - 20 lbs: Occasionally  21 - 40 lbs: Never  41 - 60 lbs: Never  61 - 100 lbs: Never 0 - 25 lbs: Occasionally  26 - 40 lbs: Never  41 -  60 lbs: Never  61 - 100 lbs: Never  100 + lbs: Never       How many total hours can the injured worker work?  full  In an eight-hour workday, how many total hours is the injured worker potentially able to work?  full    Sit: 8 hours,  With Break  Walk: 3 hours, With Break  Stand: 3 hours, With Break          Does the injured worker have any functional restrictions based only on allowed psychological conditions? No    *Note: If Yes is indicated please reference the MEDCO-16 as needed.     Additionally, in this space please provide any additional information addressing the  injured worker's capabilities and/or job accommodations which may not be addressed above. N/A       Disability information   4a.  *Note: If 3B above is \"No\" or dates updated - all 4A fields, including site/location if applicable must be completed    Updates:     Complete the chart below and furnish the narrative description of the diagnosis(es), site/location, if applicable, and ICD code for the conditions being treated due to the work- related injury/disease.  Please indicate if the condition is preventing the injured worker from returning to job duties he/she held on the date of injury.       Narrative description of the work-related allowed condition Site/Location if applicable ICD Code Is the condition preventing full duty release to the job injured worker held on date of injury?    Contusion foot Right foot S90.31xa Yes      No      No      No      No      No        4B. List all other relevant conditions that impact treatment of the conditions listed above (e.g., co-morbidities or not yet allowed conditions).        Clinical findings:    5. You can reference office notes in lieu of writing clinical findings below.  Updates:     The injured worker is progressing: "     Provide your clinical and objective findings supporting your medical opinion outlined on this form.  List barriers to return to work and reason, for the injured worker's delay in recovery.        Maximum medical improvement (MMI):  6. Updates:     MMI is a treatment plateau (static or well-stabilized) at which no fundamental       functional or physiological change can be expected within reasonable medical       probability, in spite of continuing medical or rehabilitative procedures.     Has the work-related injury(s) or occupational disease reached MMI based on the definition above?     *Note: An injured worker may need supportive treatment to maintain his or her level of function after reaching MMI. Thus, periodic medical treatment may still be requested and provided.      Vocational rehabilitation:   7. Updates:     Vocational rehabilitation is an individualized and voluntary program for an eligible injured worker who needs assistance in safely returning to work or in retaining employment.  This program can be tailored around an injured worker's restrictions and may provide job seeking skills or necessary retraining. Is the injured worker a candidate for vocational rehabilitation services focusing on return to work?      Treating physician signature - mandatory:  8. I certify the information on this form is correct to the best of my knowledge. I am aware that any person who knowingly makes a false statement, misrepresentation, concealment of fact or any other act of fraud to obtain payment as provided by [facility], or who knowingly accepts payment to which that person is not entitled, is subject to felony criminal prosecution and may be punished, under appropriate criminal provisions. by a fine or imprisonment or both.     Treating physician's name (please print legibly): Sofi Loyola PA-C  [facility] provider ([place]) number: [phone]    Complete Address, Telephone, Fax number and Date:   [facility]  [facility]  [address] [place] [address]  [phone]    Treating physician's signature: Sofi Loyola PA-C     [facility]  monserrat. History and examination consistent with contusion. No evidence of compartment syndrome, sprain, or cellulitis. Imaging is negative for fractures or other acute bony abnormalities. Plan is for RICE and supportive treatment at home. Light duty restrictions until follow up with [facility].  Patient verbalized understanding and agrees with plan.       Patient disposition: Home

## 2025-01-28 ENCOUNTER — OFFICE VISIT (OUTPATIENT)
Dept: URGENT CARE | Age: 55
End: 2025-01-28
Payer: COMMERCIAL

## 2025-01-28 VITALS
TEMPERATURE: 98.8 F | OXYGEN SATURATION: 98 % | SYSTOLIC BLOOD PRESSURE: 107 MMHG | RESPIRATION RATE: 16 BRPM | HEART RATE: 90 BPM | DIASTOLIC BLOOD PRESSURE: 69 MMHG

## 2025-01-28 DIAGNOSIS — J06.9 VIRAL UPPER RESPIRATORY TRACT INFECTION: Primary | ICD-10-CM

## 2025-01-28 DIAGNOSIS — R05.1 ACUTE COUGH: ICD-10-CM

## 2025-01-28 DIAGNOSIS — Z20.822 SUSPECTED COVID-19 VIRUS INFECTION: ICD-10-CM

## 2025-01-28 DIAGNOSIS — J02.9 SORE THROAT: ICD-10-CM

## 2025-01-28 DIAGNOSIS — R52 BODY ACHES: ICD-10-CM

## 2025-01-28 LAB
POC RAPID INFLUENZA A: NEGATIVE
POC RAPID INFLUENZA B: NEGATIVE
POC SARS-COV-2 AG BINAX: NORMAL

## 2025-01-28 PROCEDURE — 1036F TOBACCO NON-USER: CPT

## 2025-01-28 PROCEDURE — 87804 INFLUENZA ASSAY W/OPTIC: CPT

## 2025-01-28 PROCEDURE — 99213 OFFICE O/P EST LOW 20 MIN: CPT

## 2025-01-28 PROCEDURE — 87811 SARS-COV-2 COVID19 W/OPTIC: CPT

## 2025-01-28 RX ORDER — ALBUTEROL SULFATE 90 UG/1
2 INHALANT RESPIRATORY (INHALATION) EVERY 4 HOURS PRN
Qty: 8 G | Refills: 0 | Status: SHIPPED | OUTPATIENT
Start: 2025-01-28 | End: 2026-01-28

## 2025-01-28 ASSESSMENT — ENCOUNTER SYMPTOMS
EYES NEGATIVE: 1
SINUS PRESSURE: 1
COUGH: 1
ACTIVITY CHANGE: 1
CARDIOVASCULAR NEGATIVE: 1

## 2025-01-28 NOTE — PROGRESS NOTES
Subjective   Patient ID: Lashon Murguia is a 54 y.o. female. They present today with a chief complaint of Cough (Pt c/o cough, fever, sore throat, and body aches x4 days).    History of Present Illness    Cough    Lashon Murguia is a 54 y.o. female. They present today with a chief complaint of Cough (Pt c/o cough, fever, sore throat, and body aches x4 days).    Past Medical History  Allergies as of 01/28/2025    (No Known Allergies)       (Not in a hospital admission)       Past Medical History:   Diagnosis Date    Acute sinusitis 12/24/2024    Body mass index (BMI) 23.0-23.9, adult 05/18/2021    Body mass index (BMI) of 23.0 to 23.9 in adult    Dysuria 12/24/2024    Dysuria 12/24/2024    GERD (gastroesophageal reflux disease)     History of Anemia 10/10/2017    History of anemia    History of anemia 12/24/2024    HTN (hypertension)     Idiopathic scoliosis, site unspecified     Scoliosis (and kyphoscoliosis), idiopathic    Personal history of other medical treatment 10/10/2017    History of blood transfusion    Vaginal prolapse        Past Surgical History:   Procedure Laterality Date    BREAST LUMPECTOMY      HYSTERECTOMY  09/19/2016    Hysterectomy    INCONTINENCE SURGERY  05/2024    [facility]    MOLE REMOVAL      TONSILLECTOMY  09/19/2016    Tonsillectomy    TUBAL LIGATION  09/19/2016    Tubal Ligation    WISDOM TOOTH EXTRACTION          reports that she has never smoked. She has never used smokeless tobacco. She reports that she does not currently use alcohol. She reports that she does not use drugs.    Review of Systems  Review of Systems   Constitutional:  Positive for activity change.   HENT:  Positive for congestion and sinus pressure.    Eyes: Negative.    Respiratory:  Positive for cough.    Cardiovascular: Negative.      Objective    Vitals:    01/28/25 1019   BP: 107/69   Pulse: 90   Resp: 16   Temp: 37.1 °C (98.8 °F)   SpO2: 98%     No LMP recorded. Patient has had a  hysterectomy.    Physical Exam  Vitals and nursing note reviewed.   Constitutional:       Appearance: Normal appearance.   HENT:      Head: Normocephalic and atraumatic.      Right Ear: Tympanic membrane normal.      Left Ear: Tympanic membrane normal.      Nose: Nose normal.      Mouth/Throat:      Mouth: Mucous membranes are moist.      Pharynx: Oropharynx is clear.   Eyes:      Extraocular Movements: Extraocular movements intact.      Conjunctiva/sclera: Conjunctivae normal.      Pupils: Pupils are equal, round, and reactive to light.   Cardiovascular:      Rate and Rhythm: Normal rate and regular rhythm.   Pulmonary:      Effort: Pulmonary effort is normal.      Breath sounds: Normal breath sounds.   Abdominal:      General: Bowel sounds are normal.      Palpations: Abdomen is soft.   Musculoskeletal:         General: Normal range of motion.      Cervical back: Normal range of motion and neck supple.   Skin:     General: Skin is warm.      Capillary Refill: Capillary refill takes less than 2 seconds.   Neurological:      General: No focal deficit present.      Mental Status: She is alert and oriented to person, place, and time. Mental status is at baseline.   Psychiatric:         Mood and Affect: Mood normal.         Behavior: Behavior normal.         Thought Content: Thought content normal.         Judgment: Judgment normal.         Procedures    Point of Care Test & Imaging Results from this visit  Results for orders placed or performed in visit on 01/28/25   POCT Covid-19 Rapid Antigen   Result Value Ref Range    POC MATT-COV-2 AG  Presumptive negative test for SARS-CoV-2 (no antigen detected)     Presumptive negative test for SARS-CoV-2 (no antigen detected)   POCT Influenza A/B manually resulted   Result Value Ref Range    POC Rapid Influenza A Negative Negative    POC Rapid Influenza B Negative Negative      No results found.    Diagnostic study results (if any) were reviewed by Luis Barton  APRN-CNP.    Assessment/Plan   Allergies, medications, history, and pertinent labs/EKGs/Imaging reviewed by HAL Donohue.     Medical Decision Making  Upon initial assessment, the patient was sitting calmly the bedside chair in no acute/respiratory distress.  Entire physical examination is essentially benign.  Rapid COVID-19 and influenza A/B were ordered in which both were negative.  Given the short duration of her symptoms of 3 days, likely viral URI.  Recommend over-the-counter Zyrtec, Flonase, Tylenol, Motrin, Delsym and Mucinex.  Albuterol inhaler was also sent.  If symptoms are still present in the neck 7 days, reasonable for a CXR at that time to rule out any cardiopulmonary processes.  Patient agrees to plan of care was discharged stable condition.    As a result of the work-up, the patient was discharged home.  she was informed of her diagnosis and instructed to come back with any concerns or worsening of condition.  she and was agreeable to the plan as discussed above.  she was given the opportunity to ask questions.  All of the patient's questions were answered.    This document was generated using the assistance of voice recognition software. If there are any errors of spelling, grammar, syntax, or meaning; please feel free to contact me directly for clarification.   Orders and Diagnoses  Diagnoses and all orders for this visit:  Viral upper respiratory tract infection  -     albuterol (Ventolin HFA) 90 mcg/actuation inhaler; Inhale 2 puffs every 4 hours if needed for wheezing or shortness of breath.  Suspected COVID-19 virus infection  -     POCT Covid-19 Rapid Antigen  -     albuterol (Ventolin HFA) 90 mcg/actuation inhaler; Inhale 2 puffs every 4 hours if needed for wheezing or shortness of breath.  Body aches  -     POCT Covid-19 Rapid Antigen  -     POCT Influenza A/B manually resulted  -     albuterol (Ventolin HFA) 90 mcg/actuation inhaler; Inhale 2 puffs every 4 hours if needed for  wheezing or shortness of breath.  Acute cough  -     POCT Covid-19 Rapid Antigen  -     POCT Influenza A/B manually resulted  -     albuterol (Ventolin HFA) 90 mcg/actuation inhaler; Inhale 2 puffs every 4 hours if needed for wheezing or shortness of breath.  Sore throat  -     POCT Covid-19 Rapid Antigen  -     POCT Influenza A/B manually resulted  -     albuterol (Ventolin HFA) 90 mcg/actuation inhaler; Inhale 2 puffs every 4 hours if needed for wheezing or shortness of breath.      Medical Admin Record      Patient disposition: Home    Electronically signed by HAL Donohue  10:35 AM

## 2025-06-06 ENCOUNTER — APPOINTMENT (OUTPATIENT)
Dept: UROLOGY | Facility: CLINIC | Age: 55
End: 2025-06-06
Payer: COMMERCIAL

## 2025-07-08 DIAGNOSIS — Z12.31 ENCOUNTER FOR SCREENING MAMMOGRAM FOR BREAST CANCER: ICD-10-CM

## 2025-12-24 ENCOUNTER — APPOINTMENT (OUTPATIENT)
Dept: PRIMARY CARE | Facility: CLINIC | Age: 55
End: 2025-12-24
Payer: COMMERCIAL

## (undated) DEVICE — APPLICATOR, CHLORAPREP, W/ORANGE TINT, 26ML

## (undated) DEVICE — SUTURE, PDS, 0, 18 IN, LIGATING LOOP, VIOLET

## (undated) DEVICE — TRAY, FOLEY, ADVANCE, 16FR, SILICONE, W/STATLOCK

## (undated) DEVICE — Device

## (undated) DEVICE — IRRIGATION SET, CYSTOSCOPY, TURP, Y, CONTINUOUS, 81 IN

## (undated) DEVICE — ELECTRODE, OPTI2 LAPAROSCOPIC SPATULA, CURVED

## (undated) DEVICE — HEMOSTAT, ARISTA, ABSORBABLE, 3 GRAMS

## (undated) DEVICE — TROCAR, OPTICAL BLADELESS 5MM X 100 W/ADVANCED FIXATION

## (undated) DEVICE — PREP TRAY, SKIN, DRY, W/GLOVES

## (undated) DEVICE — PAD, SANITARY, OBSTETRICAL, W/ADHESIVE STRIP, WING, 11 IN, NS

## (undated) DEVICE — SUTURE, PDS II, 0, 27 IN, CT-2, VIOLET

## (undated) DEVICE — TUBE SET, PNEUMOLAR HEATED, SMOKE EVACU, HIGH-FLOW

## (undated) DEVICE — BAG, DECANTER

## (undated) DEVICE — ASSEMBLY, STRYKER FLOW 2, SUCTION IRRIGATOR, WITH TIP

## (undated) DEVICE — CAUTERY, PENCIL, PUSH BUTTON, SMOKE EVAC, 70MM

## (undated) DEVICE — GRASPER TIP, TRADITIONAL HUNTER, 22.6MM

## (undated) DEVICE — GOWN, ASTOUND, L

## (undated) DEVICE — DRAPE PACK, LAVH, W/ATTACHED LEGGINGS, W/POUCH, 100 X 114 IN, LF, STERILE

## (undated) DEVICE — SOLUTION, INJECTION, USP, SODIUM CHLORIDE 0.9%, .9, NACL, 1000 ML, BAG

## (undated) DEVICE — TISSUE ADHESIVE, PREMIERPRO EXOFIN, PRECISION PEN HV, 1.0ML

## (undated) DEVICE — APPLICATOR, ARISTA, FLEXITIP, XL, LF

## (undated) DEVICE — DISSECTOR, KITTNER, PEANUT, 5MM

## (undated) DEVICE — ACCESS SYS, KII SHIELDED BLADED, Z-THREAD, 12X100CM

## (undated) DEVICE — NEEDLE, SAFETY, 22 G X 1.5 IN

## (undated) DEVICE — DRAPE, INSTRUMENT, W/POUCH, STERI DRAPE, 9 5/8 X 18 LONG

## (undated) DEVICE — SCISSOR, MINI ENDO CUT, TIPS, DISP

## (undated) DEVICE — SUTURE, MONOCRYL, 4-0, 18 IN, PS2, UNDYED

## (undated) DEVICE — CARE KIT, LAPAROSCOPIC, ADVANCED

## (undated) DEVICE — COUNTER, NEEDLE, FOAM STRIP, DOUBLE, W/BLADEGUARD, 30 COUNT

## (undated) DEVICE — SUTURE, VICRYL, 0, 27 IN, CT-2, UNDYED

## (undated) DEVICE — POSITIONING, THE PINK PAD, PIGAZZI SYSTEM

## (undated) DEVICE — TRAY, MINOR, SINGLE BASIN, STERILE